# Patient Record
Sex: MALE | Race: WHITE | NOT HISPANIC OR LATINO | URBAN - METROPOLITAN AREA
[De-identification: names, ages, dates, MRNs, and addresses within clinical notes are randomized per-mention and may not be internally consistent; named-entity substitution may affect disease eponyms.]

---

## 2023-07-20 ENCOUNTER — INPATIENT (INPATIENT)
Facility: HOSPITAL | Age: 88
LOS: 3 days | Discharge: NURSING, REHAB OR LONG-TERM CARE | DRG: 683 | End: 2023-07-24
Attending: STUDENT IN AN ORGANIZED HEALTH CARE EDUCATION/TRAINING PROGRAM | Admitting: STUDENT IN AN ORGANIZED HEALTH CARE EDUCATION/TRAINING PROGRAM
Payer: MEDICARE

## 2023-07-20 VITALS
SYSTOLIC BLOOD PRESSURE: 167 MMHG | DIASTOLIC BLOOD PRESSURE: 78 MMHG | TEMPERATURE: 98 F | OXYGEN SATURATION: 98 % | RESPIRATION RATE: 16 BRPM | HEART RATE: 70 BPM

## 2023-07-20 DIAGNOSIS — Z95.810 PRESENCE OF AUTOMATIC (IMPLANTABLE) CARDIAC DEFIBRILLATOR: Chronic | ICD-10-CM

## 2023-07-20 DIAGNOSIS — Z95.2 PRESENCE OF PROSTHETIC HEART VALVE: Chronic | ICD-10-CM

## 2023-07-20 LAB
ALBUMIN SERPL ELPH-MCNC: 3.4 G/DL — SIGNIFICANT CHANGE UP (ref 3.3–5)
ALP SERPL-CCNC: 49 U/L — SIGNIFICANT CHANGE UP (ref 40–120)
ALT FLD-CCNC: 16 U/L — SIGNIFICANT CHANGE UP (ref 12–78)
ANION GAP SERPL CALC-SCNC: 8 MMOL/L — SIGNIFICANT CHANGE UP (ref 5–17)
APTT BLD: 29.8 SEC — SIGNIFICANT CHANGE UP (ref 27.5–35.5)
AST SERPL-CCNC: 14 U/L — LOW (ref 15–37)
BASOPHILS # BLD AUTO: 0.01 K/UL — SIGNIFICANT CHANGE UP (ref 0–0.2)
BASOPHILS NFR BLD AUTO: 0.1 % — SIGNIFICANT CHANGE UP (ref 0–2)
BILIRUB SERPL-MCNC: 0.9 MG/DL — SIGNIFICANT CHANGE UP (ref 0.2–1.2)
BUN SERPL-MCNC: 51 MG/DL — HIGH (ref 7–23)
CALCIUM SERPL-MCNC: 9 MG/DL — SIGNIFICANT CHANGE UP (ref 8.5–10.1)
CHLORIDE SERPL-SCNC: 99 MMOL/L — SIGNIFICANT CHANGE UP (ref 96–108)
CK MB BLD-MCNC: 5.6 % — HIGH (ref 0–3.5)
CK MB CFR SERPL CALC: 3.2 NG/ML — SIGNIFICANT CHANGE UP (ref 0–3.6)
CK SERPL-CCNC: 57 U/L — SIGNIFICANT CHANGE UP (ref 26–308)
CO2 SERPL-SCNC: 25 MMOL/L — SIGNIFICANT CHANGE UP (ref 22–31)
CREAT SERPL-MCNC: 2.7 MG/DL — HIGH (ref 0.5–1.3)
EGFR: 21 ML/MIN/1.73M2 — LOW
EOSINOPHIL # BLD AUTO: 0.04 K/UL — SIGNIFICANT CHANGE UP (ref 0–0.5)
EOSINOPHIL NFR BLD AUTO: 0.4 % — SIGNIFICANT CHANGE UP (ref 0–6)
GLUCOSE SERPL-MCNC: 160 MG/DL — HIGH (ref 70–99)
HCT VFR BLD CALC: 32.4 % — LOW (ref 39–50)
HGB BLD-MCNC: 11.2 G/DL — LOW (ref 13–17)
IMM GRANULOCYTES NFR BLD AUTO: 0.3 % — SIGNIFICANT CHANGE UP (ref 0–0.9)
INR BLD: 1.42 RATIO — HIGH (ref 0.88–1.16)
LACTATE SERPL-SCNC: 1.1 MMOL/L — SIGNIFICANT CHANGE UP (ref 0.7–2)
LIDOCAIN IGE QN: 90 U/L — SIGNIFICANT CHANGE UP (ref 73–393)
LYMPHOCYTES # BLD AUTO: 1.06 K/UL — SIGNIFICANT CHANGE UP (ref 1–3.3)
LYMPHOCYTES # BLD AUTO: 10.7 % — LOW (ref 13–44)
MAGNESIUM SERPL-MCNC: 2.3 MG/DL — SIGNIFICANT CHANGE UP (ref 1.6–2.6)
MCHC RBC-ENTMCNC: 29 PG — SIGNIFICANT CHANGE UP (ref 27–34)
MCHC RBC-ENTMCNC: 34.6 GM/DL — SIGNIFICANT CHANGE UP (ref 32–36)
MCV RBC AUTO: 83.9 FL — SIGNIFICANT CHANGE UP (ref 80–100)
MONOCYTES # BLD AUTO: 0.9 K/UL — SIGNIFICANT CHANGE UP (ref 0–0.9)
MONOCYTES NFR BLD AUTO: 9.1 % — SIGNIFICANT CHANGE UP (ref 2–14)
NEUTROPHILS # BLD AUTO: 7.83 K/UL — HIGH (ref 1.8–7.4)
NEUTROPHILS NFR BLD AUTO: 79.4 % — HIGH (ref 43–77)
NRBC # BLD: 0 /100 WBCS — SIGNIFICANT CHANGE UP (ref 0–0)
NT-PROBNP SERPL-SCNC: HIGH PG/ML (ref 0–450)
PLATELET # BLD AUTO: 148 K/UL — LOW (ref 150–400)
POTASSIUM SERPL-MCNC: 3.9 MMOL/L — SIGNIFICANT CHANGE UP (ref 3.5–5.3)
POTASSIUM SERPL-SCNC: 3.9 MMOL/L — SIGNIFICANT CHANGE UP (ref 3.5–5.3)
PROT SERPL-MCNC: 6.6 G/DL — SIGNIFICANT CHANGE UP (ref 6–8.3)
PROTHROM AB SERPL-ACNC: 16.7 SEC — HIGH (ref 10.5–13.4)
RBC # BLD: 3.86 M/UL — LOW (ref 4.2–5.8)
RBC # FLD: 14.4 % — SIGNIFICANT CHANGE UP (ref 10.3–14.5)
SODIUM SERPL-SCNC: 132 MMOL/L — LOW (ref 135–145)
TROPONIN I, HIGH SENSITIVITY RESULT: 28.2 NG/L — SIGNIFICANT CHANGE UP
TSH SERPL-MCNC: 1.71 UIU/ML — SIGNIFICANT CHANGE UP (ref 0.36–3.74)
WBC # BLD: 9.87 K/UL — SIGNIFICANT CHANGE UP (ref 3.8–10.5)
WBC # FLD AUTO: 9.87 K/UL — SIGNIFICANT CHANGE UP (ref 3.8–10.5)

## 2023-07-20 PROCEDURE — 99285 EMERGENCY DEPT VISIT HI MDM: CPT | Mod: 25

## 2023-07-20 PROCEDURE — 71045 X-RAY EXAM CHEST 1 VIEW: CPT | Mod: 26

## 2023-07-20 PROCEDURE — 93010 ELECTROCARDIOGRAM REPORT: CPT | Mod: 76

## 2023-07-20 PROCEDURE — 74176 CT ABD & PELVIS W/O CONTRAST: CPT | Mod: 26,MA

## 2023-07-20 RX ORDER — ONDANSETRON 8 MG/1
4 TABLET, FILM COATED ORAL ONCE
Refills: 0 | Status: COMPLETED | OUTPATIENT
Start: 2023-07-20 | End: 2023-07-20

## 2023-07-20 RX ORDER — SODIUM CHLORIDE 9 MG/ML
500 INJECTION INTRAMUSCULAR; INTRAVENOUS; SUBCUTANEOUS ONCE
Refills: 0 | Status: COMPLETED | OUTPATIENT
Start: 2023-07-20 | End: 2023-07-20

## 2023-07-20 RX ADMIN — SODIUM CHLORIDE 500 MILLILITER(S): 9 INJECTION INTRAMUSCULAR; INTRAVENOUS; SUBCUTANEOUS at 19:29

## 2023-07-20 NOTE — ED PROVIDER NOTE - CARE PLAN
1 Principal Discharge DX:	Urinary tract obstruction   Principal Discharge DX:	Urinary tract obstruction  Secondary Diagnosis:	JONATHAN (acute kidney injury)

## 2023-07-20 NOTE — ED ADULT NURSE NOTE - NSFALLRISKINTERV_ED_ALL_ED
Assistance OOB with selected safe patient handling equipment if applicable/Communicate fall risk and risk factors to all staff, patient, and family/Provide patient with walking aids/Provide visual cue: yellow wristband, yellow gown, etc/Reinforce activity limits and safety measures with patient and family/Toileting schedule using arm’s reach rule for commode and bathroom/Call bell, personal items and telephone in reach/Instruct patient to call for assistance before getting out of bed/chair/stretcher/Non-slip footwear applied when patient is off stretcher/[place] to call system/Physically safe environment - no spills, clutter or unnecessary equipment/Purposeful Proactive Rounding/Room/bathroom lighting operational, light cord in reach

## 2023-07-20 NOTE — ED ADULT TRIAGE NOTE - CHIEF COMPLAINT QUOTE
Suprapubic pain, has not urinated or had a BM for the last few days.  Abdomen noted to be distended.

## 2023-07-20 NOTE — ED ADULT NURSE NOTE - OBJECTIVE STATEMENT
Received pt c/o abd pain for 2 days with bladder distention.   Phelps placed per stat order with 1700 ML odell urine output.   Pt reports immediate relief of abd/bladder symptoms.  Denies fever at home.  Respirations even and unlabored.  Pt calm skin warm and dry in no acute distress. BN

## 2023-07-20 NOTE — ED PROVIDER NOTE - CLINICAL SUMMARY MEDICAL DECISION MAKING FREE TEXT BOX
89 male, and urinary obstruction to get Phelps catheter, follow-up CT abdomen and pelvis, send CBC, CMP, cardiac enzymes, CK, chest x-ray, gentle IV fluids and likely admit.

## 2023-07-21 DIAGNOSIS — I50.20 UNSPECIFIED SYSTOLIC (CONGESTIVE) HEART FAILURE: ICD-10-CM

## 2023-07-21 DIAGNOSIS — N13.9 OBSTRUCTIVE AND REFLUX UROPATHY, UNSPECIFIED: ICD-10-CM

## 2023-07-21 DIAGNOSIS — Z29.9 ENCOUNTER FOR PROPHYLACTIC MEASURES, UNSPECIFIED: ICD-10-CM

## 2023-07-21 DIAGNOSIS — I25.10 ATHEROSCLEROTIC HEART DISEASE OF NATIVE CORONARY ARTERY WITHOUT ANGINA PECTORIS: ICD-10-CM

## 2023-07-21 DIAGNOSIS — I10 ESSENTIAL (PRIMARY) HYPERTENSION: ICD-10-CM

## 2023-07-21 DIAGNOSIS — N17.9 ACUTE KIDNEY FAILURE, UNSPECIFIED: ICD-10-CM

## 2023-07-21 DIAGNOSIS — R93.89 ABNORMAL FINDINGS ON DIAGNOSTIC IMAGING OF OTHER SPECIFIED BODY STRUCTURES: ICD-10-CM

## 2023-07-21 DIAGNOSIS — K59.00 CONSTIPATION, UNSPECIFIED: ICD-10-CM

## 2023-07-21 DIAGNOSIS — I48.91 UNSPECIFIED ATRIAL FIBRILLATION: ICD-10-CM

## 2023-07-21 DIAGNOSIS — R33.9 RETENTION OF URINE, UNSPECIFIED: ICD-10-CM

## 2023-07-21 LAB
A1C WITH ESTIMATED AVERAGE GLUCOSE RESULT: 6.9 % — HIGH (ref 4–5.6)
ALBUMIN SERPL ELPH-MCNC: 3.1 G/DL — LOW (ref 3.3–5)
ALP SERPL-CCNC: 46 U/L — SIGNIFICANT CHANGE UP (ref 40–120)
ALT FLD-CCNC: 15 U/L — SIGNIFICANT CHANGE UP (ref 12–78)
ANION GAP SERPL CALC-SCNC: 6 MMOL/L — SIGNIFICANT CHANGE UP (ref 5–17)
ANION GAP SERPL CALC-SCNC: 8 MMOL/L — SIGNIFICANT CHANGE UP (ref 5–17)
APPEARANCE UR: ABNORMAL
APTT BLD: 24.4 SEC — LOW (ref 27.5–35.5)
APTT BLD: 29.9 SEC — SIGNIFICANT CHANGE UP (ref 27.5–35.5)
AST SERPL-CCNC: 14 U/L — LOW (ref 15–37)
BACTERIA # UR AUTO: ABNORMAL /HPF
BASOPHILS # BLD AUTO: 0.02 K/UL — SIGNIFICANT CHANGE UP (ref 0–0.2)
BASOPHILS NFR BLD AUTO: 0.3 % — SIGNIFICANT CHANGE UP (ref 0–2)
BILIRUB SERPL-MCNC: 1.1 MG/DL — SIGNIFICANT CHANGE UP (ref 0.2–1.2)
BILIRUB UR-MCNC: NEGATIVE — SIGNIFICANT CHANGE UP
BUN SERPL-MCNC: 41 MG/DL — HIGH (ref 7–23)
BUN SERPL-MCNC: 46 MG/DL — HIGH (ref 7–23)
CALCIUM SERPL-MCNC: 9 MG/DL — SIGNIFICANT CHANGE UP (ref 8.5–10.1)
CALCIUM SERPL-MCNC: 9.2 MG/DL — SIGNIFICANT CHANGE UP (ref 8.5–10.1)
CHLORIDE SERPL-SCNC: 109 MMOL/L — HIGH (ref 96–108)
CHLORIDE SERPL-SCNC: 111 MMOL/L — HIGH (ref 96–108)
CHOLEST SERPL-MCNC: 163 MG/DL — SIGNIFICANT CHANGE UP
CO2 SERPL-SCNC: 23 MMOL/L — SIGNIFICANT CHANGE UP (ref 22–31)
CO2 SERPL-SCNC: 26 MMOL/L — SIGNIFICANT CHANGE UP (ref 22–31)
COLOR SPEC: ABNORMAL
CREAT SERPL-MCNC: 1.4 MG/DL — HIGH (ref 0.5–1.3)
CREAT SERPL-MCNC: 1.8 MG/DL — HIGH (ref 0.5–1.3)
DIFF PNL FLD: ABNORMAL
EGFR: 35 ML/MIN/1.73M2 — LOW
EGFR: 47 ML/MIN/1.73M2 — LOW
EOSINOPHIL # BLD AUTO: 0.13 K/UL — SIGNIFICANT CHANGE UP (ref 0–0.5)
EOSINOPHIL NFR BLD AUTO: 2.1 % — SIGNIFICANT CHANGE UP (ref 0–6)
EPI CELLS # UR: SIGNIFICANT CHANGE UP
ESTIMATED AVERAGE GLUCOSE: 151 MG/DL — HIGH (ref 68–114)
GLUCOSE SERPL-MCNC: 143 MG/DL — HIGH (ref 70–99)
GLUCOSE SERPL-MCNC: 93 MG/DL — SIGNIFICANT CHANGE UP (ref 70–99)
GLUCOSE UR QL: NEGATIVE MG/DL — SIGNIFICANT CHANGE UP
HCT VFR BLD CALC: 33.6 % — LOW (ref 39–50)
HCT VFR BLD CALC: 34 % — LOW (ref 39–50)
HDLC SERPL-MCNC: 43 MG/DL — SIGNIFICANT CHANGE UP
HGB BLD-MCNC: 11 G/DL — LOW (ref 13–17)
HGB BLD-MCNC: 11.2 G/DL — LOW (ref 13–17)
IMM GRANULOCYTES NFR BLD AUTO: 0.2 % — SIGNIFICANT CHANGE UP (ref 0–0.9)
INR BLD: 1.42 RATIO — HIGH (ref 0.88–1.16)
INR BLD: 1.46 RATIO — HIGH (ref 0.88–1.16)
KETONES UR-MCNC: NEGATIVE MG/DL — SIGNIFICANT CHANGE UP
LEUKOCYTE ESTERASE UR-ACNC: ABNORMAL
LIPID PNL WITH DIRECT LDL SERPL: 101 MG/DL — HIGH
LYMPHOCYTES # BLD AUTO: 1.53 K/UL — SIGNIFICANT CHANGE UP (ref 1–3.3)
LYMPHOCYTES # BLD AUTO: 24.6 % — SIGNIFICANT CHANGE UP (ref 13–44)
MCHC RBC-ENTMCNC: 28.4 PG — SIGNIFICANT CHANGE UP (ref 27–34)
MCHC RBC-ENTMCNC: 28.8 PG — SIGNIFICANT CHANGE UP (ref 27–34)
MCHC RBC-ENTMCNC: 32.4 GM/DL — SIGNIFICANT CHANGE UP (ref 32–36)
MCHC RBC-ENTMCNC: 33.3 GM/DL — SIGNIFICANT CHANGE UP (ref 32–36)
MCV RBC AUTO: 86.4 FL — SIGNIFICANT CHANGE UP (ref 80–100)
MCV RBC AUTO: 87.6 FL — SIGNIFICANT CHANGE UP (ref 80–100)
MONOCYTES # BLD AUTO: 0.76 K/UL — SIGNIFICANT CHANGE UP (ref 0–0.9)
MONOCYTES NFR BLD AUTO: 12.2 % — SIGNIFICANT CHANGE UP (ref 2–14)
NEUTROPHILS # BLD AUTO: 3.78 K/UL — SIGNIFICANT CHANGE UP (ref 1.8–7.4)
NEUTROPHILS NFR BLD AUTO: 60.6 % — SIGNIFICANT CHANGE UP (ref 43–77)
NITRITE UR-MCNC: NEGATIVE — SIGNIFICANT CHANGE UP
NON HDL CHOLESTEROL: 120 MG/DL — SIGNIFICANT CHANGE UP
NRBC # BLD: 0 /100 WBCS — SIGNIFICANT CHANGE UP (ref 0–0)
NRBC # BLD: 0 /100 WBCS — SIGNIFICANT CHANGE UP (ref 0–0)
PH UR: 5.5 — SIGNIFICANT CHANGE UP (ref 5–8)
PLATELET # BLD AUTO: 135 K/UL — LOW (ref 150–400)
PLATELET # BLD AUTO: 167 K/UL — SIGNIFICANT CHANGE UP (ref 150–400)
POTASSIUM SERPL-MCNC: 3.6 MMOL/L — SIGNIFICANT CHANGE UP (ref 3.5–5.3)
POTASSIUM SERPL-MCNC: 3.7 MMOL/L — SIGNIFICANT CHANGE UP (ref 3.5–5.3)
POTASSIUM SERPL-SCNC: 3.6 MMOL/L — SIGNIFICANT CHANGE UP (ref 3.5–5.3)
POTASSIUM SERPL-SCNC: 3.7 MMOL/L — SIGNIFICANT CHANGE UP (ref 3.5–5.3)
PROT SERPL-MCNC: 6.1 G/DL — SIGNIFICANT CHANGE UP (ref 6–8.3)
PROT UR-MCNC: 100 MG/DL
PROTHROM AB SERPL-ACNC: 16.7 SEC — HIGH (ref 10.5–13.4)
PROTHROM AB SERPL-ACNC: 17.1 SEC — HIGH (ref 10.5–13.4)
RBC # BLD: 3.88 M/UL — LOW (ref 4.2–5.8)
RBC # BLD: 3.89 M/UL — LOW (ref 4.2–5.8)
RBC # FLD: 14.1 % — SIGNIFICANT CHANGE UP (ref 10.3–14.5)
RBC # FLD: 14.4 % — SIGNIFICANT CHANGE UP (ref 10.3–14.5)
RBC CASTS # UR COMP ASSIST: SIGNIFICANT CHANGE UP /HPF (ref 0–4)
SODIUM SERPL-SCNC: 140 MMOL/L — SIGNIFICANT CHANGE UP (ref 135–145)
SODIUM SERPL-SCNC: 143 MMOL/L — SIGNIFICANT CHANGE UP (ref 135–145)
SP GR SPEC: 1 — SIGNIFICANT CHANGE UP (ref 1–1.03)
TRIGL SERPL-MCNC: 106 MG/DL — SIGNIFICANT CHANGE UP
UROBILINOGEN FLD QL: 0.2 MG/DL — SIGNIFICANT CHANGE UP (ref 0.2–1)
WBC # BLD: 6.23 K/UL — SIGNIFICANT CHANGE UP (ref 3.8–10.5)
WBC # BLD: 6.51 K/UL — SIGNIFICANT CHANGE UP (ref 3.8–10.5)
WBC # FLD AUTO: 6.23 K/UL — SIGNIFICANT CHANGE UP (ref 3.8–10.5)
WBC # FLD AUTO: 6.51 K/UL — SIGNIFICANT CHANGE UP (ref 3.8–10.5)
WBC UR QL: SIGNIFICANT CHANGE UP /HPF (ref 0–5)

## 2023-07-21 PROCEDURE — 99223 1ST HOSP IP/OBS HIGH 75: CPT | Mod: GC,AI

## 2023-07-21 RX ORDER — ATORVASTATIN CALCIUM 80 MG/1
0 TABLET, FILM COATED ORAL
Qty: 0 | Refills: 0 | DISCHARGE

## 2023-07-21 RX ORDER — ATENOLOL 25 MG/1
1 TABLET ORAL
Refills: 0 | DISCHARGE

## 2023-07-21 RX ORDER — DOXAZOSIN MESYLATE 4 MG
4 TABLET ORAL AT BEDTIME
Refills: 0 | Status: DISCONTINUED | OUTPATIENT
Start: 2023-07-21 | End: 2023-07-24

## 2023-07-21 RX ORDER — APIXABAN 2.5 MG/1
2.5 TABLET, FILM COATED ORAL EVERY 12 HOURS
Refills: 0 | Status: DISCONTINUED | OUTPATIENT
Start: 2023-07-21 | End: 2023-07-21

## 2023-07-21 RX ORDER — POLYETHYLENE GLYCOL 3350 17 G/17G
17 POWDER, FOR SOLUTION ORAL DAILY
Refills: 0 | Status: DISCONTINUED | OUTPATIENT
Start: 2023-07-21 | End: 2023-07-23

## 2023-07-21 RX ORDER — LOSARTAN POTASSIUM 100 MG/1
0 TABLET, FILM COATED ORAL
Qty: 0 | Refills: 0 | DISCHARGE

## 2023-07-21 RX ORDER — FUROSEMIDE 40 MG
0 TABLET ORAL
Qty: 0 | Refills: 0 | DISCHARGE

## 2023-07-21 RX ORDER — DOXAZOSIN MESYLATE 4 MG
0 TABLET ORAL
Qty: 0 | Refills: 0 | DISCHARGE

## 2023-07-21 RX ORDER — FUROSEMIDE 40 MG
1 TABLET ORAL
Refills: 0 | DISCHARGE

## 2023-07-21 RX ORDER — SPIRONOLACTONE 25 MG/1
1 TABLET, FILM COATED ORAL
Refills: 0 | DISCHARGE

## 2023-07-21 RX ORDER — APIXABAN 2.5 MG/1
2.5 TABLET, FILM COATED ORAL EVERY 12 HOURS
Refills: 0 | Status: DISCONTINUED | OUTPATIENT
Start: 2023-07-21 | End: 2023-07-24

## 2023-07-21 RX ORDER — DOXAZOSIN MESYLATE 4 MG
1 TABLET ORAL
Refills: 0 | DISCHARGE

## 2023-07-21 RX ORDER — LANOLIN ALCOHOL/MO/W.PET/CERES
3 CREAM (GRAM) TOPICAL AT BEDTIME
Refills: 0 | Status: DISCONTINUED | OUTPATIENT
Start: 2023-07-21 | End: 2023-07-24

## 2023-07-21 RX ORDER — APIXABAN 2.5 MG/1
0 TABLET, FILM COATED ORAL
Qty: 0 | Refills: 3 | DISCHARGE

## 2023-07-21 RX ORDER — ACETAMINOPHEN 500 MG
650 TABLET ORAL EVERY 6 HOURS
Refills: 0 | Status: DISCONTINUED | OUTPATIENT
Start: 2023-07-21 | End: 2023-07-24

## 2023-07-21 RX ORDER — PANTOPRAZOLE SODIUM 20 MG/1
40 TABLET, DELAYED RELEASE ORAL ONCE
Refills: 0 | Status: COMPLETED | OUTPATIENT
Start: 2023-07-21 | End: 2023-07-21

## 2023-07-21 RX ORDER — LOSARTAN POTASSIUM 100 MG/1
1 TABLET, FILM COATED ORAL
Refills: 0 | DISCHARGE

## 2023-07-21 RX ORDER — SENNA PLUS 8.6 MG/1
2 TABLET ORAL AT BEDTIME
Refills: 0 | Status: DISCONTINUED | OUTPATIENT
Start: 2023-07-21 | End: 2023-07-23

## 2023-07-21 RX ORDER — APIXABAN 2.5 MG/1
1 TABLET, FILM COATED ORAL
Refills: 0 | DISCHARGE

## 2023-07-21 RX ORDER — ATENOLOL 25 MG/1
25 TABLET ORAL DAILY
Refills: 0 | Status: DISCONTINUED | OUTPATIENT
Start: 2023-07-21 | End: 2023-07-24

## 2023-07-21 RX ORDER — ACETAMINOPHEN 500 MG
650 TABLET ORAL EVERY 6 HOURS
Refills: 0 | Status: DISCONTINUED | OUTPATIENT
Start: 2023-07-21 | End: 2023-07-21

## 2023-07-21 RX ORDER — SPIRONOLACTONE 25 MG/1
0 TABLET, FILM COATED ORAL
Qty: 0 | Refills: 0 | DISCHARGE

## 2023-07-21 RX ADMIN — ATENOLOL 25 MILLIGRAM(S): 25 TABLET ORAL at 05:59

## 2023-07-21 RX ADMIN — Medication 4 MILLIGRAM(S): at 22:54

## 2023-07-21 RX ADMIN — SODIUM CHLORIDE 500 MILLILITER(S): 9 INJECTION INTRAMUSCULAR; INTRAVENOUS; SUBCUTANEOUS at 02:00

## 2023-07-21 RX ADMIN — APIXABAN 2.5 MILLIGRAM(S): 2.5 TABLET, FILM COATED ORAL at 17:22

## 2023-07-21 RX ADMIN — APIXABAN 2.5 MILLIGRAM(S): 2.5 TABLET, FILM COATED ORAL at 02:27

## 2023-07-21 RX ADMIN — POLYETHYLENE GLYCOL 3350 17 GRAM(S): 17 POWDER, FOR SOLUTION ORAL at 13:33

## 2023-07-21 RX ADMIN — Medication 1 ENEMA: at 15:39

## 2023-07-21 RX ADMIN — PANTOPRAZOLE SODIUM 40 MILLIGRAM(S): 20 TABLET, DELAYED RELEASE ORAL at 21:56

## 2023-07-21 RX ADMIN — SENNA PLUS 2 TABLET(S): 8.6 TABLET ORAL at 22:04

## 2023-07-21 NOTE — H&P ADULT - NSHPREVIEWOFSYSTEMS_GEN_ALL_CORE
CONSTITUTIONAL: +weakness. denies fever, chills, diaphoresis, dizziness, lightheadedness  HEENT: denies blurred vision, sore throat, cough  SKIN: denies new lesions, rash, hives, itching  CARDIOVASCULAR: denies chest pain, chest pressure, palpitations  RESPIRATORY: denies shortness of breath, MENDOZA, wheezing, sputum production  GASTROINTESTINAL: +nausea, +vomiting, +constipation, +abd pain. Denies diarrhea, constipation, hematochezia, melena  GENITOURINARY: +anuria. Denies dysuria, polyuria, hematuria, discharge  NEUROLOGICAL: denies syncope, LOC, numbness, headache, focal weakness  MUSCULOSKELETAL: denies new joint pain, joint swelling, muscle aches  HEMATOLOGIC: denies gross bleeding, bruising  LYMPHATICS: denies enlarged lymph nodes, extremity swelling  PSYCHIATRIC: denies recent changes in anxiety, depression  ENDOCRINOLOGIC: denies sweating, cold or heat intolerance 2

## 2023-07-21 NOTE — H&P ADULT - PROBLEM SELECTOR PLAN 5
chronic  - continue home atenolol with hold parameters  - hold home spironolactone, losartan, and lasix in setting of JONATHAN   - monitor hemodynamics

## 2023-07-21 NOTE — H&P ADULT - NSHPSOCIALHISTORY_GEN_ALL_CORE
Tobacco: ex-smoker  EtOH: occassional  Recreational drug use: denies  Lives with: patient lives alone at home  Ambulates: uses cane  ADLs: independent Tobacco: ex-smoker  EtOH: occasional  Recreational drug use: denies  Lives with: patient lives alone at home  Ambulates: uses cane  ADLs: independent

## 2023-07-21 NOTE — H&P ADULT - NSICDXFAMILYHX_GEN_ALL_CORE_FT
FAMILY HISTORY:  No pertinent family history in first degree relatives FAMILY HISTORY:  Father  Still living? Unknown  FHx: heart disease, Age at diagnosis: Age Unknown    Mother  Still living? No  FHx: heart disease, Age at diagnosis: Age Unknown

## 2023-07-21 NOTE — ED ADULT NURSE REASSESSMENT NOTE - NS ED NURSE REASSESS COMMENT FT1
pt reavaluated admitted med surg medicated as ordered vital signs stable awaiting bed assignment
received pt stable ekg done and reviewed pt stable awaiting ct scan urine with odell color noted
report given pt stable in no distress awaiting transfer to floor

## 2023-07-21 NOTE — PHYSICAL THERAPY INITIAL EVALUATION ADULT - PERTINENT HX OF CURRENT PROBLEM, REHAB EVAL
89y male with PMHx of HFrEF s/p PPM/AICD, CAD s/p CABG x6, Atrial fibrillation (on eliquis), s/p AVR, HTN, HLD, BPH with LUTS, squamous cell carcinoma admitted with urinary tract obstruction and JONATHAN likely secondary to prostatomegaly.

## 2023-07-21 NOTE — H&P ADULT - PROBLEM SELECTOR PLAN 3
CT abd pelvis: Trace bilateral pleural effusions with associated atelectasis at the lung bases  - no signs of volume overload at this time  - monitor Is and Os  - continue home atenolol with hold parameters  -  hold home spironolactone and lasix in setting of JONATHAN CT abd pelvis: Trace bilateral pleural effusions with associated atelectasis at the lung bases  - no signs of volume overload at this time  - monitor Is and Os  - continue home atenolol with hold parameters  - hold home spironolactone and lasix in setting of JONATHAN

## 2023-07-21 NOTE — H&P ADULT - PROBLEM SELECTOR PLAN 4
chronic  - continue home eliquis 2.5mg BID  - monitor for signs of acute bleed chronic  - continue atenolol for rate control  - continue home eliquis 2.5mg BID  - monitor for signs of acute bleed

## 2023-07-21 NOTE — H&P ADULT - HISTORY OF PRESENT ILLNESS
89y male with PMHx of HFrEF s/p PPM/AICD, CAD, Atrial fibrillation (on eliquis), s/p AVR, HTN, HLD, BPH with LUTS presented to the ED for the evaluation of abdominal pain and weakness associated with decreased urinary output and constipation.    In the ED,  Vital Signs T(F): 98 HR: 70 BP: 167/78 RR: 16 SpO2: 98%  Labs significant for: H/H 11.2/32.4, INR 1.42, Na 132, BUN/Cr 51/2.70, proBNP 75728  CXR: increased markings with no effusion seen, left sided cardiac pacemaker in place on personal review  CT A/P: Enlarged prostate gland with mild bilateral hydroureteronephrosis and perinephric stranding. Subcentimeter pancreatic cystic lesions measuring up to 0.9 cm in the pancreatic neck. MRI/MRCP can be obtained for further evaluation. Trace bilateral pleural effusions with associated atelectasis at the lung bases. Cholelithiasis.  EKG: Vpaced at 70bpm 89y male with PMHx of HFrEF s/p PPM/AICD, CAD s/p CABG x6, Atrial fibrillation (on eliquis), s/p AVR, HTN, HLD, BPH with LUTS, squamous cell carcinoma presented to the ED for the evaluation of abdominal pain and weakness associated with decreased urinary output and constipation for the past 4 days. Patient developed nausea for the past 2 days and two episodes of NBNB vomiting. Patient and daughter denies fever, chills, CP, SOB, dysuria, hematuria, diarrhea. Patient states after bautista was placed in ED there was ~2L of drainage and symptoms of abdominal pain resolved.     In the ED,  Vital Signs T(F): 98 HR: 70 BP: 167/78 RR: 16 SpO2: 98%  Labs significant for: H/H 11.2/32.4, INR 1.42, Na 132, BUN/Cr 51/2.70, proBNP 43435  CXR: increased markings with no effusion seen, left sided cardiac pacemaker in place on personal review  CT A/P: Enlarged prostate gland with mild bilateral hydroureteronephrosis and perinephric stranding. Subcentimeter pancreatic cystic lesions measuring up to 0.9 cm in the pancreatic neck. MRI/MRCP can be obtained for further evaluation. Trace bilateral pleural effusions with associated atelectasis at the lung bases. Cholelithiasis.  EKG: Vpaced at 70bpm

## 2023-07-21 NOTE — H&P ADULT - NSICDXPASTMEDICALHX_GEN_ALL_CORE_FT
PAST MEDICAL HISTORY:  Atrial fibrillation     BPH (benign prostatic hyperplasia)     CAD (coronary artery disease)     HFrEF (heart failure with reduced ejection fraction)     HLD (hyperlipidemia)     HTN (hypertension)     Squamous cell carcinoma of skin

## 2023-07-21 NOTE — H&P ADULT - PROBLEM SELECTOR PLAN 2
JONATHAN likely post-renal in setting of prostatomegaly  - Cr 2.7 on admission  - Monitor BMP  - monitor Is and Os  - Avoid nephrotoxic medications, hold home spironolactone, losartan and lasix  - Monitor renal indices  - Nephro [place] consulted JONATHAN likely post-renal in setting of prostatomegaly  - Cr 2.7 on admission  - Monitor BMP  - given 500cc in the ER, hold off further fluids  - monitor Is and Os  - Avoid nephrotoxic medications, hold home spironolactone, losartan and lasix  - Monitor renal indices  - Nephro NJ consulted

## 2023-07-21 NOTE — PATIENT PROFILE ADULT - FALL HARM RISK - HARM RISK INTERVENTIONS

## 2023-07-21 NOTE — H&P ADULT - PROBLEM SELECTOR PLAN 7
CT abd/pelvis: Subcentimeter pancreatic cystic lesions measuring up to 0.9 cm in the pancreatic neck  - liver function and lipase WNL  - f/u outpatient CT abd/pelvis: Subcentimeter pancreatic cystic lesions measuring up to 0.9 cm in the pancreatic neck  - liver function and lipase WNL  - outpatient follow up

## 2023-07-21 NOTE — H&P ADULT - NSICDXPASTSURGICALHX_GEN_ALL_CORE_FT
PAST SURGICAL HISTORY:  S/P AVR     S/P implantation of automatic cardioverter/defibrillator (AICD)

## 2023-07-21 NOTE — PHYSICAL THERAPY INITIAL EVALUATION ADULT - ADDITIONAL COMMENTS
Patient reports that he lives in a private house, lives alone, has 4 daughters who take turns assisting the patient. Has been independent with ambulation using cane/RW up to this point. Has cane/RW at home.

## 2023-07-21 NOTE — H&P ADULT - ASSESSMENT
89y male with PMHx of HFrEF s/p PPM/AICD, CAD, Atrial fibrillation (on eliquis), s/p AVR, HTN, HLD, BPH with LUTS admitted with urinary tract obstruction and JONATHAN likely secondary to prostatomegaly. 89y male with PMHx of HFrEF s/p PPM/AICD, CAD s/p CABG x6, Atrial fibrillation (on eliquis), s/p AVR, HTN, HLD, BPH with LUTS, squamous cell carcinoma admitted with urinary tract obstruction and JONATHAN likely secondary to prostatomegaly.

## 2023-07-21 NOTE — H&P ADULT - NSHPPHYSICALEXAM_GEN_ALL_CORE
T(C): 36.7 (07-20-23 @ 16:57), Max: 36.7 (07-20-23 @ 16:57)  HR: 70 (07-20-23 @ 16:57) (70 - 70)  BP: 167/78 (07-20-23 @ 16:57) (167/78 - 167/78)  RR: 16 (07-20-23 @ 16:57) (16 - 16)  SpO2: 98% (07-20-23 @ 16:57) (98% - 98%)    GENERAL: patient appears well, no acute distress, appropriate, pleasant  EYES: sclera clear, no exudates  ENMT: oropharynx clear without erythema, no exudates, moist mucous membranes, conjunctiva and sclera clear, PERRLA, EOMI  NECK: supple, soft, no thyromegaly noted  LUNGS: good air entry bilaterally, clear to auscultation, symmetric breath sounds, no wheezing, rhonchi, or rales appreciated  HEART: soft S1/S2, regular rate and rhythm, no murmurs noted, +2 pitting lower extremity edema  GASTROINTESTINAL: abdomen is soft, nontender, nondistended, normoactive bowel sounds, no palpable masses. No guarding, rebound or rigidity  GENITOURINARY: Phelps bag draining ~1L of pink urine, no blood clots or acute hemorrhage  VASCULAR: + DP pulses present, no varicose veins  INTEGUMENT: +old midline chest scar, warm and dry, color normal, good skin turgor, no lesions noted  MUSCULOSKELETAL: no clubbing or cyanosis, no obvious deformity  NEUROLOGIC: AAOx3, good muscle tone in 4 extremities, motor Strength 5/5 in UE & LE B/L, no obvious sensory deficits  PSYCHIATRIC: mood is good, affect is congruent, linear and logical thought process  HEME/LYMPH: no obvious ecchymosis or petechiae T(C): 36.7 (07-20-23 @ 16:57), Max: 36.7 (07-20-23 @ 16:57)  HR: 70 (07-20-23 @ 16:57) (70 - 70)  BP: 167/78 (07-20-23 @ 16:57) (167/78 - 167/78)  RR: 16 (07-20-23 @ 16:57) (16 - 16)  SpO2: 98% (07-20-23 @ 16:57) (98% - 98%)    GENERAL: patient appears well, no acute distress, appropriate, pleasant  EYES: sclera clear, no exudates  ENMT: oropharynx clear without erythema, no exudates, moist mucous membranes, conjunctiva and sclera clear, PERRLA, EOMI  NECK: supple, soft, no thyromegaly noted  LUNGS: good air entry bilaterally, clear to auscultation, symmetric breath sounds, no wheezing, rhonchi, or rales appreciated  HEART: soft S1/S2, regular rate and rhythm, no murmurs noted, +2 pitting lower extremity edema  GASTROINTESTINAL: abdomen is soft, nontender, nondistended, normoactive bowel sounds, no palpable masses. No guarding, rebound or rigidity  GENITOURINARY: Phelps bag draining ~1L of pink urine, no blood clots or acute hemorrhage  VASCULAR: + DP pulses present, no varicose veins  INTEGUMENT: +old midline chest scar, warm and dry, color normal, good skin turgor, no lesions noted  MUSCULOSKELETAL: no clubbing or cyanosis, no obvious deformity  NEUROLOGIC: AAOx3, good muscle tone in 4 extremities, motor Strength 5/5 in UE & LE B/L, no obvious sensory deficits  PSYCHIATRIC: normal affect  HEME/LYMPH: no obvious ecchymosis or petechiae

## 2023-07-21 NOTE — PATIENT PROFILE ADULT - HAVE YOU RECENTLY LOST WEIGHT WITHOUT TRYING?
[address] [facility] [address] [id number] [address]  Dept: [phone]     PAIN PROGRESS NOTE  Date of patient's visit: 9/8/2020  Patient's Name:  Sabra Carney YOB: 1948            Patient Care Team:  Jennifer Donaldson PA-C as PCP - General (Physician Assistant)  Jennifer Donaldson PA-C as PCP - [facility] Empaneled Provider  Durga Lezama MD as Consulting Physician (Gastroenterology)  Satnam Thibodeaux MD as Consulting Physician [facility])      Chief Complaint   Patient presents with    Nail Problem    Foot Pain       Subjective: This Sabra Carney comes to clinic for foot and nail care. Pt currently has complaint of thickened, painful, elongated nails that he/she cannot manage by themselves. Pt. Relates pain to nails with shoe gear. Pt's primary care physician is Jennifer Donaldson PA-C last seen august 28 2020.      Past Medical History:   Diagnosis Date    Anxiety     Hyperlipidemia     Hypertension        Allergies   Allergen Reactions    Cupric Oxide     Chrome Alum     Cobalt     Copper-Containing Compounds     Nickel      Current Outpatient Medications on File Prior to Visit   Medication Sig Dispense Refill    pregabalin (LYRICA) 50 MG capsule TAKE 1 CAPSULE BY MOUTH THREE TIMES A DAY  90 capsule 1    pilocarpine (SALAGEN) 5 MG tablet TAKE 1 TABLET BY MOUTH THREE TIMES A DAY 90 tablet 3    atorvastatin (LIPITOR) 10 MG tablet TAKE 1 TABLET BY MOUTH ONE TIME A DAY 90 tablet 3    calcium carbonate (OSCAL) 500 MG TABS tablet Take 500 mg by mouth daily      magnesium 30 MG tablet Take 30 mg by mouth 2 times daily      cholestyramine (QUESTRAN) 4 g packet Take 1 packet by mouth 2 times daily 90 packet 3    allopurinol (ZYLOPRIM) 300 MG tablet TAKE 1 TABLET BY MOUTH ONE TIME A DAY 90 tablet 3    traZODone (DESYREL) 50 MG tablet Take 1 tablet by mouth nightly 30 tablet 2    omeprazole (PRILOSEC) 20 MG delayed release capsule TAKE 1 CAPSULE BY MOUTH ONE TIME A DAY. 90 capsule 3    citalopram (CELEXA) 10 MG tablet TAKE 1 TABLET BY MOUTH ONE TIME A DAY 30 tablet 5    topiramate (TOPAMAX) 50 MG tablet Take 1 tablet by mouth 2 times daily 60 tablet 5    clopidogrel (PLAVIX) 75 MG tablet Take 1 tablet by mouth daily 90 tablet 3    carvedilol (COREG) 6.25 MG tablet Take 1 tablet by mouth daily 30 tablet 2    Galcanezumab-gnlm (EMGALITY) 120 MG/ML SOAJ Inject into the skin      lisinopril (PRINIVIL;ZESTRIL) 10 MG tablet TAKE 1 TABLET BY MOUTH ONE TIME A DAY 30 tablet 5    Multiple Vitamins-Minerals (OCUVITE PRESERVISION PO) Take 1 tablet by mouth daily      Acetaminophen (TYLENOL EXTRA STRENGTH PO) Take by mouth Indications: prn      POTASSIUM GLUCONATE PO Take by mouth daily       No current facility-administered medications on file prior to visit. Review of Systems. Review of Systems:   History obtained from chart review and the patient  General ROS: negative for - chills, fatigue, fever, night sweats or weight gain  Constitutional: Negative for chills, diaphoresis, fatigue, fever and unexpected weight change. Musculoskeletal: Positive for arthralgias, gait problem and joint swelling. Neurological ROS: negative for - behavioral changes, confusion, headaches or seizures. Negative for weakness and numbness. Dermatological ROS: negative for - mole changes, rash  Cardiovascular: Negative for leg swelling. Gastrointestinal: Negative for constipation, diarrhea, nausea and vomiting. Objective:  Dermatologic Exam:  Skin lesion/ulceration Absent . Skin No rashes or nodules noted. .   Skin is thin, with flaky sloughing skin as well as decreased hair growth to the lower leg  Small red hemosiderin deposits seen dorsal foot   Musculoskeletal:     1st MPJ ROM decreased, Bilateral.  Muscle strength 5/5, Bilateral.  Pain present upon palpation of toenails 1-5, Bilateral. decreased medial longitudinal arch, Bilateral.  Ankle ROM decreased,Bilateral.    Dorsally contracted digits present digits 2, Bilateral.     Vascular: DP pulses 1/4 bilateral.  PT pulses 0/4 bilateral.   CFT <5 seconds, Bilateral.  Hair growth absent to the level of the digits, Bilateral.  Edema present, Bilateral.  Varicosities absent, Bilateral. Erythema absent, Bilateral    Neurological: Sensation diminshed to light touch to level of digits, Bilateral.  Protective sensation intact 6/10 sites via 5.07/10g [place] Monofilament, Bilateral.  negative Tinel's, Bilateral.  negative Valleix sign, Bilateral.      Integument: Warm, dry, supple, Bilateral.  Open lesion absent, Bilateral.  Interdigital maceration absent to web spaces 4, Bilateral.  Nails 1-5 left and 1-5 right thickened > 3.0 mm, dystrophic and crumbly, discolored with subungual debris. Fissures absent, Bilateral.   General: AAO x 3 in NAD. Derm  Toenail Description  Sites of Onychomycosis Involvement (Check affected area)  [x] [x] [x] [x] [x] [x] [x] [x] [x] [x]  5 4 3 2 1 1 2 3 4 5                          Right                                        Left    Thickness  [x] [x] [x] [x] [x] [x] [x] [x] [x] [x]  5 4 3 2 1 1 2 3 4 5                         Right                                        Left    Dystrophic Changes   [x] [x] [x] [x] [x] [x] [x] [x] [x] [x]  5 4 3 2 1 1 2 3 4 5                         Right                                        Left    Color  [x] [x] [x] [x] [x] [x] [x] [x] [x] [x]  5 4 3 2 1 1 2 3 4 5                          Right                                        Left    Incurvation/Ingrowin   [] [] [] [] [] [] [] [] [] []  5 4 3 2 1 1 2 3 4 5                         Right                                        Left    Inflammation/Pain   [x] [x] [x] [x] [x] [x] [x] [x] [x] [x]  5 4 3  2 1 1 2 3 4 5                         Right                                        Left       Nails are painful 1-10 with direct palpation.       Q7   []Yes  []No Q8   [x]Yes  []No                     Q9   []Yes    []No  Assessment:  70 y.o. male with:    Diagnosis Orders   1. Dermatophytosis of nail  85897 - [place] DEBRIDEMENT OF NAILS, 6 OR MORE   2. Bilateral lower extremity pain  79820 - [place] DEBRIDEMENT OF NAILS, 6 OR MORE   3. PVD (peripheral vascular disease) ([facility] [address]  94683 - [place] DEBRIDEMENT OF NAILS, 6 OR MORE   4. Ingrown nail  89773 - [place] DEBRIDEMENT OF NAILS, 6 OR MORE         Plan:   Pt was evaluated and examined. Patient was given personalized discharge instructions. Nails 1-10 were debrided in length and thickness sharply with a nail nipper and  without incident. Pt will follow up in 9 weeks or sooner if any problems arise. Diagnosis was discussed with the pt and all of their questions were answered in detail. Proper foot hygiene and care was discussed with the pt. Patient to check feet daily and contact the office with any questions/problems/concerns. Other comorbidity noted and will be managed by PCP. Pain waiver discussed with patient and confirmed.    9/8/2020      Electronically signed by Lynda Hanson DPM on 9/8/2020 at 10:04 AM  9/8/2020 Yes...

## 2023-07-21 NOTE — H&P ADULT - ATTENDING COMMENTS
89y male with PMHx of HFrEF s/p PPM/AICD, CAD s/p CABG x6, Atrial fibrillation (on eliquis), s/p AVR, HTN, HLD, BPH with LUTS, squamous cell carcinoma admitted with urinary tract obstruction and JONATHAN likely secondary to prostatomegaly. Admit to medicine. Likely all postobstructive. Hold off further fluids in setting of CHF. Monitor renal indices. Hold ARB and diuretics. Nephrology consult Dr. Blackman. [facility] hospitalist to follow in AM. Consider urology evaluation in AM (needs urology follow up). Discussed with patient at bedside.    Agree with H&P as outlined above, edited where appropriate.

## 2023-07-21 NOTE — CHART NOTE - NSCHARTNOTEFT_GEN_A_CORE
Called by RN for Pt c/o hematuria, dark stool. Pt evaluated at bedside, states that he feels well. No longer constipated as he had a BM today following administration of Miralax, fleet enema. Reports that he experienced a similar episode of hematuria the previous time he had a Phelps catheter. Denies any abdominal pain, pelvic pain, gross blood in stool. Denies lightheadedness, shortness of breath, chest pain, palpitations.    T(C): 37 (07-21-23 @ 11:40), Max: 37 (07-21-23 @ 11:40)  HR: 87 (07-21-23 @ 11:40) (70 - 87)  BP: 108/63 (07-21-23 @ 11:40) (108/63 - 135/62)  RR: 18 (07-21-23 @ 11:40) (16 - 18)  SpO2: 95% (07-21-23 @ 11:40) (94% - 98%)    Physical :  Gen- NAD  Cardio - S1/S2 present  Lung - cta b/l  Abdomen- +BS, NT/ND, no guarding, no rebound, no masses  Ext- mild lower extremity edema, 2+ pulses b/l  Genitourinary: Phelps draining ~350mL red-colored urine, no gross blood clots    Assessment/Plan  89yo M with BPH, HFrEF, AVR admitted with JONATHAN due to urinary retention.  - Stat CBC- H/H 11/34; stable from admission (H/H 11.2/33.3)  - Stat BMP- BUN/Cr 46, 1.4; JONATHAN improving  - Stat PT 16.7, PTT 29.9, INR 1.42  - Stat type & screen, FOBT, ferritin, serum folate, iron with TIBC  - IV protonix 40mg IV x1  - Home dose Eliquis 2.5mg initially held- will resume Eliquis 2.5mg BID as pt does not present with acute signs of bleeding  - Continue to monitor Phelps output  - RN to call with changes. Called by RN for hematuria, and dark stool. Pt evaluated at bedside, states that he feels well. No longer constipated as he had a BM today following administration of Miralax, fleet enema. Reports that he experienced a similar episode of hematuria the previous time he had a Phelps catheter. Denies any abdominal pain, pelvic pain, gross blood in stool. Denies lightheadedness, shortness of breath, chest pain, palpitations.    T(C): 37 (07-21-23 @ 11:40), Max: 37 (07-21-23 @ 11:40)  HR: 87 (07-21-23 @ 11:40) (70 - 87)  BP: 108/63 (07-21-23 @ 11:40) (108/63 - 135/62)  RR: 18 (07-21-23 @ 11:40) (16 - 18)  SpO2: 95% (07-21-23 @ 11:40) (94% - 98%)    Physical :  Gen- NAD  Cardio - S1/S2 present  Lung - cta b/l  Abdomen- +BS, NT/ND, no guarding, no rebound, no masses  Ext- mild lower extremity edema, 2+ pulses b/l  Genitourinary: Phelps draining ~350mL red-colored urine, no gross active bleeding or blood clots    Assessment/Plan  89yo M with BPH, HFrEF, AVR admitted with JONATHAN due to urinary retention.  RN called for hematuria and dark stools.    - Stat CBC- H/H 11/34; stable from admission (H/H 11.2/33.3)  - Stat BMP- BUN/Cr 46, 1.4; JONATHAN improving  - Stat PT 16.7, PTT 29.9, INR 1.42  - ?melenas. f/u FOBT, anemia workup.    - Type & screen ordered.  - IV protonix 40mg IV x1  - Continue Eliquis 2.5mg BID as pt does not present with acute signs of active bleeding.   - Continue to monitor Phelps output. Hematuria suspected from Phelps placement.   - RN to call with changes.

## 2023-07-21 NOTE — H&P ADULT - PROBLEM SELECTOR PLAN 1
Pt with hx of BPH with LUTS presents with abd and pain dec urine outpatient s/p bautista with drainage of ~2L urine and resolution of abd pain  - CT A/P: Enlarged prostate gland with mild bilateral hydroureteronephrosis and perinephric stranding.   - urinary tract obstruction likely secondary to prostatomegaly  - continue indwelling bautista  - continue home doxazosin   - monitor Is and Os  - does not follow urology outpatient currently  - low suspicion of UTI at this time, no leukocytosis, afebrile, f/u urine and blood cx, monitor off abx Pt with hx of BPH with LUTS presents with abd and pain dec urine outpatient s/p bautista with drainage of ~2L urine and resolution of abd pain  - CT A/P: Enlarged prostate gland with mild bilateral hydroureteronephrosis and perinephric stranding.   - urinary tract obstruction likely secondary to prostatomegaly  - continue indwelling bautista  - continue home doxazosin   - monitor Is and Os  - low suspicion of UTI at this time, no leukocytosis, afebrile, f/u urine and blood cx, monitor off abx  - does not follow urology outpatient currently, consider urology consult in AM

## 2023-07-21 NOTE — PATIENT PROFILE ADULT - LEGAL HELP
EMERGENCY DEPARTMENT HISTORY AND PHYSICAL EXAM    2:24 PM      Date: 6/22/2018  Patient Name: Ganga Inman    History of Presenting Illness     Chief Complaint   Patient presents with    Hypotension    Fatigue         History Provided By: Patient    Chief Complaint: Weakness  Duration:  1 Day  Timing:  Constant  Location: Generalized  Severity: Moderate  Modifying Factors: Took insulin no relief. Associated Symptoms: Tingling lips, hypotension      Additional History (Context): Ganga Inman is a 37 y.o. female with a PMHx of HTN, depression, MS, diabetes presents with constant generalized, moderate weakness onset x 1 day ago. Associated Sx include tingling lips and hypotension. She said she took her antihypertensive and antidepressant medication this morning. Pt states she took insulin to see if this would resolved this issue and found no relief. Pt went to receive back surgery and was hypotensive which is when she was recommended to go seek further medical assistance. Denies having cough, vomiting, diarrhea, dysuria, hematuria. No other concerns or symptoms at this time. PCP: Wally Chamberlain MD    Current Outpatient Prescriptions   Medication Sig Dispense Refill    ibuprofen (MOTRIN) 600 mg tablet Take 1 Tab by mouth every six (6) hours as needed for Pain. 20 Tab 0    gabapentin (NEURONTIN) 300 mg capsule Take 300 mg by mouth nightly. 1-2 tabs      promethazine (PHENERGAN) 25 mg tablet Take 25 mg by mouth every six (6) hours as needed for Nausea.  amLODIPine (NORVASC) 10 mg tablet Take 10 mg by mouth daily.  metFORMIN (GLUCOPHAGE) 500 mg tablet Take 1.5 Tabs by mouth two (2) times daily (with meals). 60 Tab 0    DULoxetine (CYMBALTA) 60 mg capsule Take 60 mg by mouth daily.  QUEtiapine (SEROQUEL) 300 mg tablet Take 300 mg by mouth nightly.  glatiramer (COPAXONE) 40 mg/mL injection 40 mg by SubCUTAneous route every Monday, Wednesday, Friday.          Past History     Past Medical History:  Past Medical History:   Diagnosis Date    Arthropathy, unspecified, site unspecified     Depression     Diabetes [facility] [address]     Hypercholesteremia     Hypertension     history of htn    Incontinence of urine     Insomnia     Migraine     MS (multiple sclerosis) ([facility] [address]     Neurogenic bladder     Other ill-defined conditions(799.89)     multiple Sclerosis    Seizures ([facility] [address]     6/2013    Wears glasses        Past Surgical History:  Past Surgical History:   Procedure Laterality Date    HX CARPAL TUNNEL RELEASE  11/2008    HX HYSTERECTOMY      HX OTHER SURGICAL         Family History:  Family History   Problem Relation Age of Onset    Heart Disease Father     Diabetes Father     Diabetes Sister     Other Other        Social History:  Social History   Substance Use Topics    Smoking status: Never Smoker    Smokeless tobacco: Never Used    Alcohol use No       Allergies: Allergies   Allergen Reactions    Levemir [Insulin Detemir] Hives    Oxycontin [Oxycodone] Hives and Other (comments)     intolerance         Review of Systems     Review of Systems   Constitutional: Negative for diaphoresis and fever. HENT: Negative for congestion and sore throat. Tingling lips. Eyes: Negative for pain and itching. Respiratory: Negative for cough and shortness of breath. Cardiovascular: Negative for chest pain and palpitations. Gastrointestinal: Negative for abdominal pain and diarrhea. Endocrine: Negative for polydipsia and polyuria. Genitourinary: Negative for dysuria and hematuria. Musculoskeletal: Negative for arthralgias and myalgias. Skin: Negative for rash and wound. Neurological: Positive for weakness. Negative for seizures and syncope. Hematological: Does not bruise/bleed easily. Psychiatric/Behavioral: Negative for agitation and hallucinations.          Physical Exam     Visit Vitals    /59    Pulse 67    Temp 97.6 °F (36.4 °C)    Resp 13    Ht 5' 3\" (1.6 m)    Wt 106.6 kg (235 lb)    SpO2 99%    BMI 41.63 kg/m2       Physical Exam   Constitutional: She appears well-developed and well-nourished. HENT:   Head: Normocephalic and atraumatic. Eyes: Conjunctivae are normal. No scleral icterus. Neck: Normal range of motion. Neck supple. No JVD present. Cardiovascular: Normal rate, regular rhythm and normal heart sounds. 4 intact extremity pulses   Pulmonary/Chest: Effort normal and breath sounds normal.   Abdominal: Soft. She exhibits no mass. There is no tenderness. Musculoskeletal: Normal range of motion. Lymphadenopathy:     She has no cervical adenopathy. Neurological: She is alert. Skin: Skin is warm and dry. Nursing note and vitals reviewed. Diagnostic Study Results   Labs -  Recent Results (from the past 12 hour(s))   EKG, 12 LEAD, INITIAL    Collection Time: 06/22/18  1:58 PM   Result Value Ref Range    Ventricular Rate 58 BPM    Atrial Rate 58 BPM    P-R Interval 176 ms    QRS Duration 92 ms    Q-T Interval 424 ms    QTC Calculation (Bezet) 416 ms    Calculated P Axis 49 degrees    Calculated R Axis 12 degrees    Calculated T Axis 18 degrees    Diagnosis       Sinus bradycardia  Nonspecific ST and T wave abnormality  Abnormal ECG  When compared with ECG of 27-DEC-2017 13:59,  Vent. rate has decreased BY  35 BPM     CBC WITH AUTOMATED DIFF    Collection Time: 06/22/18  2:31 PM   Result Value Ref Range    WBC 6.7 4.6 - 13.2 K/uL    RBC 4.45 4.20 - 5.30 M/uL    HGB 12.8 12.0 - 16.0 g/dL    HCT 36.1 35.0 - 45.0 %    MCV 81.1 74.0 - 97.0 FL    MCH 28.8 24.0 - 34.0 PG    MCHC 35.5 31.0 - 37.0 g/dL    RDW 13.3 11.6 - 14.5 %    PLATELET 182 (H) 465 - 420 K/uL    MPV 10.0 9.2 - 11.8 FL    NEUTROPHILS 51 40 - 73 %    LYMPHOCYTES 40 21 - 52 %    MONOCYTES 8 3 - 10 %    EOSINOPHILS 1 0 - 5 %    BASOPHILS 0 0 - 2 %    ABS. NEUTROPHILS 3.4 1.8 - 8.0 K/UL    ABS. LYMPHOCYTES 2.7 0.9 - 3.6 K/UL    ABS. MONOCYTES 0.5 0.05 - 1.2 K/UL    ABS. EOSINOPHILS 0.1 0.0 - 0.4 K/UL    ABS. BASOPHILS 0.0 0.0 - 0.06 K/UL    DF AUTOMATED     METABOLIC PANEL, COMPREHENSIVE    Collection Time: 06/22/18  2:31 PM   Result Value Ref Range    Sodium 135 (L) 136 - 145 mmol/L    Potassium 3.9 3.5 - 5.5 mmol/L    Chloride 101 100 - 108 mmol/L    CO2 23 21 - 32 mmol/L    Anion gap 11 3.0 - 18 mmol/L    Glucose 181 (H) 74 - 99 mg/dL    BUN 17 7.0 - 18 MG/DL    Creatinine 1.51 (H) 0.6 - 1.3 MG/DL    BUN/Creatinine ratio 11 (L) 12 - 20      GFR est AA 46 (L) >60 ml/min/1.73m2    GFR est non-AA 38 (L) >60 ml/min/1.73m2    Calcium 9.3 8.5 - 10.1 MG/DL    Bilirubin, total 0.7 0.2 - 1.0 MG/DL    ALT (SGPT) 58 (H) 13 - 56 U/L    AST (SGOT) 36 15 - 37 U/L    Alk. phosphatase 80 45 - 117 U/L    Protein, total 7.3 6.4 - 8.2 g/dL    Albumin 3.8 3.4 - 5.0 g/dL    Globulin 3.5 2.0 - 4.0 g/dL    A-G Ratio 1.1 0.8 - 1.7     TSH 3RD GENERATION    Collection Time: 06/22/18  2:31 PM   Result Value Ref Range    TSH 1.05 0.36 - 3.74 uIU/mL   URINALYSIS W/ RFLX MICROSCOPIC    Collection Time: 06/22/18  4:30 PM   Result Value Ref Range    Color YELLOW      Appearance CLOUDY      Specific gravity 1.006 1.005 - 1.030      pH (UA) 6.0 5.0 - 8.0      Protein NEGATIVE  NEG mg/dL    Glucose NEGATIVE  NEG mg/dL    Ketone NEGATIVE  NEG mg/dL    Bilirubin NEGATIVE  NEG      Blood NEGATIVE  NEG      Urobilinogen 0.2 0.2 - 1.0 EU/dL    Nitrites NEGATIVE  NEG      Leukocyte Esterase NEGATIVE  NEG         Radiologic Studies -   No orders to display     No results found. Medications ordered:   Medications   sodium chloride 0.9 % bolus infusion 1,000 mL (0 mL IntraVENous IV Completed 6/22/18 1650)         Medical Decision Making   Initial Medical Decision Making and DDx:  No complaint of pain, cough, dysuria to focus exam. Do not suspect sepsis. Will get basic lab work and TSH. Possible side effect of antihypertensive medication  DDx: UTI, dehydration, hypothyroidism.     ED Course: Progress Notes, Reevaluation, and Consults:  5:02 PM Presentation is consistent with dehydration. Her creatinine level was elevated, subsequent BP has been better. She is feeling better. 5:07 PM Pt reevaluated at this time. Discussed results and findings, as well as, diagnosis and plan for discharge. Follow up with doctors/services listed. Return to the emergency department for alarming symptoms. Pt verbalizes understanding and agreement with plan. All questions addressed. I am the first provider for this patient. I reviewed the vital signs, available nursing notes, past medical history, past surgical history, family history and social history. Vital Signs-Reviewed the patient's vital signs. Pulse Oximetry Analysis - 99 % in room air, normal    EKG: Interpreted by the EP. Time Interpreted: 13:58 PM   Rate: 58 bpm   Rhythm: Sinus bradycardia   Interpretation: No acute process    Records Reviewed: Nursing Notes and Old Medical Records (Time of Review: 2:24 PM)      Diagnosis     Clinical Impression:   1. Hypotension, unspecified hypotension type    2. Dehydration        Disposition: DC    Follow-up Information     Follow up With Details Comments Wayne Villanueva MD In 2 days  [address]  [phone]             Patient's Medications   Start Taking    No medications on file   Continue Taking    AMLODIPINE (NORVASC) 10 MG TABLET    Take 10 mg by mouth daily. DULOXETINE (CYMBALTA) 60 MG CAPSULE    Take 60 mg by mouth daily. GABAPENTIN (NEURONTIN) 300 MG CAPSULE    Take 300 mg by mouth nightly. 1-2 tabs    GLATIRAMER (COPAXONE) 40 MG/ML INJECTION    40 mg by SubCUTAneous route every Monday, Wednesday, Friday. IBUPROFEN (MOTRIN) 600 MG TABLET    Take 1 Tab by mouth every six (6) hours as needed for Pain. METFORMIN (GLUCOPHAGE) 500 MG TABLET    Take 1.5 Tabs by mouth two (2) times daily (with meals).     PROMETHAZINE (PHENERGAN) 25 MG TABLET Take 25 mg by mouth every six (6) hours as needed for Nausea. QUETIAPINE (SEROQUEL) 300 MG TABLET    Take 300 mg by mouth nightly. These Medications have changed    No medications on file   Stop Taking    No medications on file     _______________________________    Attestations:  [address] and Celine Appl acting as a scribe for and in the presence of Danielle Merino MD      June 22, 2018 at 2:24 PM       Provider Attestation:      I personally performed the services described in the documentation, reviewed the documentation, as recorded by the scribe in my presence, and it accurately and completely records my words and actions.  June 22, 2018 at 2:24 PM - Danielle Merino MD    _______________________________ no

## 2023-07-22 LAB
ANION GAP SERPL CALC-SCNC: 5 MMOL/L — SIGNIFICANT CHANGE UP (ref 5–17)
APPEARANCE UR: CLEAR — SIGNIFICANT CHANGE UP
BASOPHILS # BLD AUTO: 0.02 K/UL — SIGNIFICANT CHANGE UP (ref 0–0.2)
BASOPHILS NFR BLD AUTO: 0.4 % — SIGNIFICANT CHANGE UP (ref 0–2)
BILIRUB UR-MCNC: NEGATIVE — SIGNIFICANT CHANGE UP
BUN SERPL-MCNC: 40 MG/DL — HIGH (ref 7–23)
CALCIUM SERPL-MCNC: 8.9 MG/DL — SIGNIFICANT CHANGE UP (ref 8.5–10.1)
CHLORIDE SERPL-SCNC: 112 MMOL/L — HIGH (ref 96–108)
CO2 SERPL-SCNC: 24 MMOL/L — SIGNIFICANT CHANGE UP (ref 22–31)
COLOR SPEC: ABNORMAL
CREAT SERPL-MCNC: 1.2 MG/DL — SIGNIFICANT CHANGE UP (ref 0.5–1.3)
DIFF PNL FLD: ABNORMAL
EGFR: 56 ML/MIN/1.73M2 — LOW
EOSINOPHIL # BLD AUTO: 0.15 K/UL — SIGNIFICANT CHANGE UP (ref 0–0.5)
EOSINOPHIL NFR BLD AUTO: 3 % — SIGNIFICANT CHANGE UP (ref 0–6)
FERRITIN SERPL-MCNC: 169 NG/ML — SIGNIFICANT CHANGE UP (ref 30–400)
FOLATE SERPL-MCNC: 16.1 NG/ML — SIGNIFICANT CHANGE UP
GLUCOSE SERPL-MCNC: 100 MG/DL — HIGH (ref 70–99)
GLUCOSE UR QL: NEGATIVE MG/DL — SIGNIFICANT CHANGE UP
HCT VFR BLD CALC: 30 % — LOW (ref 39–50)
HCT VFR BLD CALC: 30.8 % — LOW (ref 39–50)
HGB BLD-MCNC: 10 G/DL — LOW (ref 13–17)
HGB BLD-MCNC: 10.1 G/DL — LOW (ref 13–17)
IMM GRANULOCYTES NFR BLD AUTO: 0.2 % — SIGNIFICANT CHANGE UP (ref 0–0.9)
IRON SATN MFR SERPL: 23 UG/DL — LOW (ref 45–165)
IRON SATN MFR SERPL: 9 % — LOW (ref 16–55)
KETONES UR-MCNC: NEGATIVE MG/DL — SIGNIFICANT CHANGE UP
LEUKOCYTE ESTERASE UR-ACNC: ABNORMAL
LYMPHOCYTES # BLD AUTO: 1.6 K/UL — SIGNIFICANT CHANGE UP (ref 1–3.3)
LYMPHOCYTES # BLD AUTO: 32 % — SIGNIFICANT CHANGE UP (ref 13–44)
MAGNESIUM SERPL-MCNC: 2.2 MG/DL — SIGNIFICANT CHANGE UP (ref 1.6–2.6)
MCHC RBC-ENTMCNC: 28.8 PG — SIGNIFICANT CHANGE UP (ref 27–34)
MCHC RBC-ENTMCNC: 33.3 GM/DL — SIGNIFICANT CHANGE UP (ref 32–36)
MCV RBC AUTO: 86.5 FL — SIGNIFICANT CHANGE UP (ref 80–100)
MONOCYTES # BLD AUTO: 0.49 K/UL — SIGNIFICANT CHANGE UP (ref 0–0.9)
MONOCYTES NFR BLD AUTO: 9.8 % — SIGNIFICANT CHANGE UP (ref 2–14)
NEUTROPHILS # BLD AUTO: 2.73 K/UL — SIGNIFICANT CHANGE UP (ref 1.8–7.4)
NEUTROPHILS NFR BLD AUTO: 54.6 % — SIGNIFICANT CHANGE UP (ref 43–77)
NITRITE UR-MCNC: NEGATIVE — SIGNIFICANT CHANGE UP
NRBC # BLD: 0 /100 WBCS — SIGNIFICANT CHANGE UP (ref 0–0)
PH UR: 5.5 — SIGNIFICANT CHANGE UP (ref 5–8)
PHOSPHATE SERPL-MCNC: 2.9 MG/DL — SIGNIFICANT CHANGE UP (ref 2.5–4.5)
PLATELET # BLD AUTO: 143 K/UL — LOW (ref 150–400)
POTASSIUM SERPL-MCNC: 3.4 MMOL/L — LOW (ref 3.5–5.3)
POTASSIUM SERPL-SCNC: 3.4 MMOL/L — LOW (ref 3.5–5.3)
PROT UR-MCNC: 30 MG/DL
RBC # BLD: 3.47 M/UL — LOW (ref 4.2–5.8)
RBC # FLD: 14.3 % — SIGNIFICANT CHANGE UP (ref 10.3–14.5)
SODIUM SERPL-SCNC: 141 MMOL/L — SIGNIFICANT CHANGE UP (ref 135–145)
SP GR SPEC: 1.01 — SIGNIFICANT CHANGE UP (ref 1–1.03)
TIBC SERPL-MCNC: 242 UG/DL — SIGNIFICANT CHANGE UP (ref 220–430)
TRANSFERRIN SERPL-MCNC: 171 MG/DL — LOW (ref 200–360)
UIBC SERPL-MCNC: 219 UG/DL — SIGNIFICANT CHANGE UP (ref 110–370)
UROBILINOGEN FLD QL: 0.2 MG/DL — SIGNIFICANT CHANGE UP (ref 0.2–1)
VIT B12 SERPL-MCNC: 423 PG/ML — SIGNIFICANT CHANGE UP (ref 232–1245)
WBC # BLD: 5 K/UL — SIGNIFICANT CHANGE UP (ref 3.8–10.5)
WBC # FLD AUTO: 5 K/UL — SIGNIFICANT CHANGE UP (ref 3.8–10.5)

## 2023-07-22 RX ORDER — POTASSIUM CHLORIDE 20 MEQ
40 PACKET (EA) ORAL ONCE
Refills: 0 | Status: COMPLETED | OUTPATIENT
Start: 2023-07-22 | End: 2023-07-22

## 2023-07-22 RX ADMIN — APIXABAN 2.5 MILLIGRAM(S): 2.5 TABLET, FILM COATED ORAL at 05:52

## 2023-07-22 RX ADMIN — Medication 40 MILLIEQUIVALENT(S): at 17:32

## 2023-07-22 RX ADMIN — SENNA PLUS 2 TABLET(S): 8.6 TABLET ORAL at 22:04

## 2023-07-22 RX ADMIN — Medication 4 MILLIGRAM(S): at 22:04

## 2023-07-22 RX ADMIN — APIXABAN 2.5 MILLIGRAM(S): 2.5 TABLET, FILM COATED ORAL at 17:32

## 2023-07-22 NOTE — CARE COORDINATION ASSESSMENT. - NSDCPLANREVIEW_GEN_ALL_CORE
Patient was seen for assessment to be alert and oriented. He was seen with two daughters by bedside. Pt has not recommended him for Sub Acute rehab but the family is requesting a referral. [facility] is first choice. They were provided the with a list of rehabs and requested choices in preference order. The patient lives alone and has multiple levels in his home. Her uses a cane. He is somewhat reluctant to go to rehab but agreeable. Educated them on discharge planning process. o request BRITT./Patient

## 2023-07-22 NOTE — CARE COORDINATION ASSESSMENT. - NSCAREPROVIDERS_GEN_ALL_CORE_FT
CARE PROVIDERS:  Accepting Physician: Vinnie Live  Admitting: Vinnie Live  Attending: Vinnie Live  Case Management: Earle So  Consultant: Weil, Patricia  Consultant: Bernardo Wilcox  Consultant: Tommy Araujo  Consultant: Ty Blackman  Consultant: Bon San  Covering Team: Danelle Strickland  Covering Team: Cheli Casillas ED Attending: Michael Mcdonald  ED Nurse: Odessa Solis  HIM/Billing & Coding: Polnia Elaine  Nurse: Selene Yeager  Ordered: ServiceAccount, SCMMLM  Ordered: Doctor, Unknown  Ordered: ADM, User  Outpatient Provider: Miko Page  Outpatient Provider: Diogo Cyr  PCA/Nursing Assistant: Mirta Kendrick  Registered Dietitian: Mallorie Aguilar  Respiratory Therapy: Adam Kaminski  : Polina Curry

## 2023-07-22 NOTE — CARE COORDINATION ASSESSMENT. - NSPASTMEDSURGHISTORY_GEN_ALL_CORE_FT
PAST MEDICAL & SURGICAL HISTORY:  Squamous cell carcinoma of skin      CAD (coronary artery disease)      Atrial fibrillation      HLD (hyperlipidemia)      HTN (hypertension)      BPH (benign prostatic hyperplasia)      HFrEF (heart failure with reduced ejection fraction)      S/P implantation of automatic cardioverter/defibrillator (AICD)      S/P AVR

## 2023-07-23 LAB
ANION GAP SERPL CALC-SCNC: 5 MMOL/L — SIGNIFICANT CHANGE UP (ref 5–17)
BUN SERPL-MCNC: 28 MG/DL — HIGH (ref 7–23)
CALCIUM SERPL-MCNC: 8.8 MG/DL — SIGNIFICANT CHANGE UP (ref 8.5–10.1)
CHLORIDE SERPL-SCNC: 111 MMOL/L — HIGH (ref 96–108)
CO2 SERPL-SCNC: 25 MMOL/L — SIGNIFICANT CHANGE UP (ref 22–31)
CREAT ?TM UR-MCNC: 29 MG/DL — SIGNIFICANT CHANGE UP
CREAT SERPL-MCNC: 1.1 MG/DL — SIGNIFICANT CHANGE UP (ref 0.5–1.3)
EGFR: 63 ML/MIN/1.73M2 — SIGNIFICANT CHANGE UP
GLUCOSE SERPL-MCNC: 147 MG/DL — HIGH (ref 70–99)
HCT VFR BLD CALC: 31.3 % — LOW (ref 39–50)
HGB BLD-MCNC: 10.6 G/DL — LOW (ref 13–17)
MCHC RBC-ENTMCNC: 28.8 PG — SIGNIFICANT CHANGE UP (ref 27–34)
MCHC RBC-ENTMCNC: 33.9 GM/DL — SIGNIFICANT CHANGE UP (ref 32–36)
MCV RBC AUTO: 85.1 FL — SIGNIFICANT CHANGE UP (ref 80–100)
NRBC # BLD: 0 /100 WBCS — SIGNIFICANT CHANGE UP (ref 0–0)
OB PNL STL: NEGATIVE — SIGNIFICANT CHANGE UP
OSMOLALITY UR: 241 MOSM/KG — SIGNIFICANT CHANGE UP (ref 50–1200)
PLATELET # BLD AUTO: 150 K/UL — SIGNIFICANT CHANGE UP (ref 150–400)
POTASSIUM SERPL-MCNC: 4 MMOL/L — SIGNIFICANT CHANGE UP (ref 3.5–5.3)
POTASSIUM SERPL-SCNC: 4 MMOL/L — SIGNIFICANT CHANGE UP (ref 3.5–5.3)
POTASSIUM UR-SCNC: 14.9 MMOL/L — SIGNIFICANT CHANGE UP
PROT ?TM UR-MCNC: 24 MG/DL — HIGH (ref 0–12)
PROT/CREAT UR-RTO: 0.9 RATIO — HIGH (ref 0–0.2)
RBC # BLD: 3.68 M/UL — LOW (ref 4.2–5.8)
RBC # FLD: 14.1 % — SIGNIFICANT CHANGE UP (ref 10.3–14.5)
SODIUM SERPL-SCNC: 141 MMOL/L — SIGNIFICANT CHANGE UP (ref 135–145)
SODIUM UR-SCNC: <20 MMOL/L — SIGNIFICANT CHANGE UP
UUN UR-MCNC: 451 MG/DL — SIGNIFICANT CHANGE UP
WBC # BLD: 6.12 K/UL — SIGNIFICANT CHANGE UP (ref 3.8–10.5)
WBC # FLD AUTO: 6.12 K/UL — SIGNIFICANT CHANGE UP (ref 3.8–10.5)

## 2023-07-23 RX ORDER — POLYETHYLENE GLYCOL 3350 17 G/17G
17 POWDER, FOR SOLUTION ORAL DAILY
Refills: 0 | Status: DISCONTINUED | OUTPATIENT
Start: 2023-07-23 | End: 2023-07-24

## 2023-07-23 RX ADMIN — Medication 10 MILLIGRAM(S): at 22:22

## 2023-07-23 RX ADMIN — APIXABAN 2.5 MILLIGRAM(S): 2.5 TABLET, FILM COATED ORAL at 06:29

## 2023-07-23 RX ADMIN — ATENOLOL 25 MILLIGRAM(S): 25 TABLET ORAL at 06:29

## 2023-07-23 RX ADMIN — Medication 4 MILLIGRAM(S): at 22:22

## 2023-07-23 RX ADMIN — APIXABAN 2.5 MILLIGRAM(S): 2.5 TABLET, FILM COATED ORAL at 18:06

## 2023-07-24 ENCOUNTER — TRANSCRIPTION ENCOUNTER (OUTPATIENT)
Age: 88
End: 2023-07-24

## 2023-07-24 VITALS
OXYGEN SATURATION: 93 % | TEMPERATURE: 98 F | HEART RATE: 68 BPM | DIASTOLIC BLOOD PRESSURE: 61 MMHG | SYSTOLIC BLOOD PRESSURE: 125 MMHG | RESPIRATION RATE: 18 BRPM

## 2023-07-24 DIAGNOSIS — E11.9 TYPE 2 DIABETES MELLITUS WITHOUT COMPLICATIONS: ICD-10-CM

## 2023-07-24 LAB
CULTURE RESULTS: NO GROWTH — SIGNIFICANT CHANGE UP
SPECIMEN SOURCE: SIGNIFICANT CHANGE UP

## 2023-07-24 PROCEDURE — 84484 ASSAY OF TROPONIN QUANT: CPT

## 2023-07-24 PROCEDURE — 99285 EMERGENCY DEPT VISIT HI MDM: CPT

## 2023-07-24 PROCEDURE — 83036 HEMOGLOBIN GLYCOSYLATED A1C: CPT

## 2023-07-24 PROCEDURE — 83540 ASSAY OF IRON: CPT

## 2023-07-24 PROCEDURE — 87040 BLOOD CULTURE FOR BACTERIA: CPT

## 2023-07-24 PROCEDURE — 85027 COMPLETE CBC AUTOMATED: CPT

## 2023-07-24 PROCEDURE — 80053 COMPREHEN METABOLIC PANEL: CPT

## 2023-07-24 PROCEDURE — 86850 RBC ANTIBODY SCREEN: CPT

## 2023-07-24 PROCEDURE — 82553 CREATINE MB FRACTION: CPT

## 2023-07-24 PROCEDURE — 51702 INSERT TEMP BLADDER CATH: CPT

## 2023-07-24 PROCEDURE — 84443 ASSAY THYROID STIM HORMONE: CPT

## 2023-07-24 PROCEDURE — 97116 GAIT TRAINING THERAPY: CPT

## 2023-07-24 PROCEDURE — 83935 ASSAY OF URINE OSMOLALITY: CPT

## 2023-07-24 PROCEDURE — 82607 VITAMIN B-12: CPT

## 2023-07-24 PROCEDURE — 84100 ASSAY OF PHOSPHORUS: CPT

## 2023-07-24 PROCEDURE — 82272 OCCULT BLD FECES 1-3 TESTS: CPT

## 2023-07-24 PROCEDURE — 84156 ASSAY OF PROTEIN URINE: CPT

## 2023-07-24 PROCEDURE — 82728 ASSAY OF FERRITIN: CPT

## 2023-07-24 PROCEDURE — 83735 ASSAY OF MAGNESIUM: CPT

## 2023-07-24 PROCEDURE — 85018 HEMOGLOBIN: CPT

## 2023-07-24 PROCEDURE — 97162 PT EVAL MOD COMPLEX 30 MIN: CPT

## 2023-07-24 PROCEDURE — 96361 HYDRATE IV INFUSION ADD-ON: CPT

## 2023-07-24 PROCEDURE — 84300 ASSAY OF URINE SODIUM: CPT

## 2023-07-24 PROCEDURE — 85014 HEMATOCRIT: CPT

## 2023-07-24 PROCEDURE — 86901 BLOOD TYPING SEROLOGIC RH(D): CPT

## 2023-07-24 PROCEDURE — 87086 URINE CULTURE/COLONY COUNT: CPT

## 2023-07-24 PROCEDURE — 84540 ASSAY OF URINE/UREA-N: CPT

## 2023-07-24 PROCEDURE — 85610 PROTHROMBIN TIME: CPT

## 2023-07-24 PROCEDURE — 96360 HYDRATION IV INFUSION INIT: CPT

## 2023-07-24 PROCEDURE — 71045 X-RAY EXAM CHEST 1 VIEW: CPT

## 2023-07-24 PROCEDURE — 85730 THROMBOPLASTIN TIME PARTIAL: CPT

## 2023-07-24 PROCEDURE — 81001 URINALYSIS AUTO W/SCOPE: CPT

## 2023-07-24 PROCEDURE — 83550 IRON BINDING TEST: CPT

## 2023-07-24 PROCEDURE — 86900 BLOOD TYPING SEROLOGIC ABO: CPT

## 2023-07-24 PROCEDURE — 99221 1ST HOSP IP/OBS SF/LOW 40: CPT

## 2023-07-24 PROCEDURE — 84133 ASSAY OF URINE POTASSIUM: CPT

## 2023-07-24 PROCEDURE — 82550 ASSAY OF CK (CPK): CPT

## 2023-07-24 PROCEDURE — 83690 ASSAY OF LIPASE: CPT

## 2023-07-24 PROCEDURE — 80061 LIPID PANEL: CPT

## 2023-07-24 PROCEDURE — 93005 ELECTROCARDIOGRAM TRACING: CPT

## 2023-07-24 PROCEDURE — 82746 ASSAY OF FOLIC ACID SERUM: CPT

## 2023-07-24 PROCEDURE — 83605 ASSAY OF LACTIC ACID: CPT

## 2023-07-24 PROCEDURE — 82570 ASSAY OF URINE CREATININE: CPT

## 2023-07-24 PROCEDURE — 84466 ASSAY OF TRANSFERRIN: CPT

## 2023-07-24 PROCEDURE — 74176 CT ABD & PELVIS W/O CONTRAST: CPT | Mod: MA

## 2023-07-24 PROCEDURE — 80048 BASIC METABOLIC PNL TOTAL CA: CPT

## 2023-07-24 PROCEDURE — 36415 COLL VENOUS BLD VENIPUNCTURE: CPT

## 2023-07-24 PROCEDURE — 83880 ASSAY OF NATRIURETIC PEPTIDE: CPT

## 2023-07-24 PROCEDURE — 85025 COMPLETE CBC W/AUTO DIFF WBC: CPT

## 2023-07-24 RX ORDER — DUTASTERIDE 0.5 MG/1
1 CAPSULE, LIQUID FILLED ORAL
Qty: 30 | Refills: 0
Start: 2023-07-24

## 2023-07-24 RX ORDER — ATENOLOL 25 MG/1
1 TABLET ORAL
Refills: 0 | DISCHARGE

## 2023-07-24 RX ORDER — POLYETHYLENE GLYCOL 3350 17 G/17G
17 POWDER, FOR SOLUTION ORAL
Qty: 0 | Refills: 0 | DISCHARGE
Start: 2023-07-24

## 2023-07-24 RX ORDER — METOPROLOL TARTRATE 50 MG
1 TABLET ORAL
Qty: 0 | Refills: 0 | DISCHARGE

## 2023-07-24 RX ORDER — DUTASTERIDE 0.5 MG/1
1 CAPSULE, LIQUID FILLED ORAL
Qty: 0 | Refills: 0 | DISCHARGE

## 2023-07-24 RX ADMIN — POLYETHYLENE GLYCOL 3350 17 GRAM(S): 17 POWDER, FOR SOLUTION ORAL at 13:42

## 2023-07-24 RX ADMIN — ATENOLOL 25 MILLIGRAM(S): 25 TABLET ORAL at 06:47

## 2023-07-24 RX ADMIN — APIXABAN 2.5 MILLIGRAM(S): 2.5 TABLET, FILM COATED ORAL at 06:47

## 2023-07-24 NOTE — SOCIAL WORK PROGRESS NOTE - NSSWPROGRESSNOTE_GEN_ALL_CORE
Patient to be discharged to [facility] rehab via [facility]. All I agreement. Copy of chart to follow

## 2023-07-24 NOTE — CONSULT NOTE ADULT - PROBLEM SELECTOR RECOMMENDATION 9
Type 2 A1c 6.9% adm: obstrctive and reflux uropathy  Recommend endocrine if persistent hyperglycemia -Perlman on consult  FU appt: TBA  DSC recommendations: return to home diet controlled regimen  diabetes education provided verbally and handout   Diabetes support info and cell # [phone] given   Goal 100-180 mg/dL; 140-180 mg/dL in critical care areas

## 2023-07-24 NOTE — PROGRESS NOTE ADULT - ASSESSMENT
89y male with PMHx of HFrEF s/p PPM/AICD, CAD s/p CABG x6, Atrial fibrillation (on eliquis), s/p AVR, HTN, HLD, BPH with LUTS, squamous cell carcinoma presented to the ED for the evaluation of abdominal pain and weakness associated with decreased urinary output and constipation for the past 4 days.     JONATHAN, urinary retention  no leukocytosis, afebrile  UA w/o pyuria  pt denies urinary symptoms, no flank pain  abd pain resolved s/p bautista placement  CT abd/pelvis- Enlarged prostate gland with mild bilateral hydroureteronephrosis and perinephric stranding.    Recommendations  continue off antibiotics  will ask Dr Cyr regarding Urology consult    From an ID standpoint no further requirement for inpatient status for the management of ID issues. Fine with discharge from ID standpoint when other medical issues no longer require inpatient care and social issues allow for a safe discharge plan. To schedule an outpatient ID follow up appointment please call our office at [phone]    Thank you for consulting us and involving us in the management of this most interesting and challenging case.  Please call us at [phone] or text me directly on my cell#[phone] with any concerns or further questions.  
JONATHAN on Likely CKD  Urinary Retention   Hyponatremia  HTN    -Unknown, but likely CKD given age and comorbidities  -JONATHAN likely 2/2 urinary retention, resolving   -Check Urine lytes - pending but not necessary abymore   -UA does not suggest GN. No indication for serologies or kidney biopsy   -Phelps placed  -Sodium correcting appropriately  -No indication for RRT  
JONATHAN on Likely CKD  Urinary Retention   Hyponatremia  HTN    -Unknown, but likely CKD given age and comorbidities  -JONATHAN likely 2/2 urinary retention, resolving   -Check Urine lytes - pending but not necessary anymore   -UA does not suggest GN. No indication for serologies or kidney biopsy   -Phelps placed  -Sodium correcting appropriately  -No indication for RRT  -Will need outpatient follow up    7/24/23-d/w family  
89m with BPH, HFrEF, AVR admitted with JONATHAN due to urinary retention  bautista in place, draining well  constipation relieved with bowel regimen and enema  continue doxazosin  previously on avodart, self-dced    JONATHAN  improving with bautista  avoid nephrotoxins  renal following    HF/AVR  continue atenolol and eliquis  need to clarify CHF regimen
JONATHAN  Hyponatremia  B/L Hydronephrosis  Pancreatic Mass?  Phelps placement  Check blood and urine studies  Consult to follow in AM
JONATHAN on Likely CKD  Urinary Retention   Hyponatremia  HTN    -Unknown, but likely CKD given age and comorbidities  -JONATHAN likely 2/2 urinary retention, resolving   -Check Urine lytes - pending   -UA does not suggest GN. No indication for serologies or kidney biopsy   -Phelps placed  -Sodium correcting appropriately  -No indication for RRT  
89m with BPH, HFrEF, AVR admitted with JONATHAN due to urinary retention  bautista in place, draining well  constipation relieved with bowel regimen and enema  continue doxazosin  previously on avodart, self-dced  voiding trial    JONATHAN  improving with bautista  avoid nephrotoxins  renal following    HF/AVR  continue atenolol and eliquis  need to clarify CHF regimen
89m with BPH, HFrEF, AVR admitted with JONATHAN due to urinary retention  bautista in place, draining well  continue doxazosin  previously on avodart, self-dced  voiding trial next week in rehab    constipation  increased bowel regimen    JONATHAN  improving with bautista  avoid nephrotoxins  renal following    HF/AVR  continue atenolol and eliquis  need to clarify CHF regimen    DC to rehab when bed available

## 2023-07-24 NOTE — DISCHARGE NOTE PROVIDER - NSDCMRMEDTOKEN_GEN_ALL_CORE_FT
bisacodyl 5 mg oral delayed release tablet: 2 tab(s) orally once a day (at bedtime)  doxazosin 4 mg oral tablet: 1 tab(s) orally once a day (at bedtime)  dutasteride 0.5 mg oral capsule: 1 orally once a day  Eliquis 2.5 mg oral tablet: 1 tab(s) orally 2 times a day  Lasix 40 mg oral tablet: 1 tab(s) orally once a day  losartan 25 mg oral tablet: 1 orally once a day  polyethylene glycol 3350 oral powder for reconstitution: 17 gram(s) orally once a day  spironolactone 25 mg oral tablet: 1 tab(s) orally once a day  Toprol-XL 50 mg oral tablet, extended release: 1 orally once a day

## 2023-07-24 NOTE — DISCHARGE NOTE PROVIDER - PROVIDER TOKENS
PROVIDER:[TOKEN:[[id number]]],PROVIDER:[TOKEN:[[id number]]],PROVIDER:[TOKEN:[[id number]]]

## 2023-07-24 NOTE — CONSULT NOTE ADULT - SUBJECTIVE AND OBJECTIVE BOX
Patient is a 89y old  Male who presents with a chief complaint of Urinary obstruction/JONATHAN (21 Jul 2023 01:17)       HPI:  89y male with PMHx of HFrEF s/p PPM/AICD, CAD s/p CABG x6, Atrial fibrillation (on eliquis), s/p AVR, HTN, HLD, BPH with LUTS, squamous cell carcinoma presented to the ED for the evaluation of abdominal pain and weakness associated with decreased urinary output and constipation for the past 4 days. Patient developed nausea for the past 2 days and two episodes of NBNB vomiting. Patient and daughter denies fever, chills, CP, SOB, dysuria, hematuria, diarrhea. Patient states after bautista was placed in ED there was ~2L of drainage and symptoms of abdominal pain resolved.   Has not seen nephrologist in the past.  Denies any NSAID usage.  Feeling better.    PAST MEDICAL & SURGICAL HISTORY:  HFrEF (heart failure with reduced ejection fraction)      BPH (benign prostatic hyperplasia)      HTN (hypertension)      HLD (hyperlipidemia)      Atrial fibrillation      CAD (coronary artery disease)      Squamous cell carcinoma of skin      S/P AVR      S/P implantation of automatic cardioverter/defibrillator (AICD)           FAMILY HISTORY:  FHx: heart disease (Father, Mother)    NC    Social History:Non smoker    MEDICATIONS  (STANDING):  apixaban 2.5 milliGRAM(s) Oral every 12 hours  atenolol  Tablet 25 milliGRAM(s) Oral daily  doxazosin 4 milliGRAM(s) Oral at bedtime  senna 2 Tablet(s) Oral at bedtime    MEDICATIONS  (PRN):  acetaminophen     Tablet .. 650 milliGRAM(s) Oral every 6 hours PRN Mild Pain (1 - 3)  melatonin 3 milliGRAM(s) Oral at bedtime PRN Insomnia  polyethylene glycol 3350 17 Gram(s) Oral daily PRN Constipation   Meds reviewed    Allergies    Percocet (Other)  Levaquin (Other)  Benadryl (Other)    Intolerances         REVIEW OF SYSTEMS:    Review of Systems:   Constitutional: Denies fatigue  HEENT: Denies headaches and dizziness  Respiratory: denies SOB, cough, or wheezing  Cardiovascular: denies CP, palpitations  Gastrointestinal: Denies nausea, denies vomiting, diarrhea, constipation, abdominal pain, or bloody stools  Genitourinary: denies painful urination, increased frequency, urgency, or bloody urine  Skin: denies rashes or itching  Musculoskeletal: denies muscle aches, joint swelling  Neurologic: Denies generalized weakness, denies loss of sensation, numbness, or tingling      Vital Signs Last 24 Hrs  T(C): 36.6 (21 Jul 2023 03:35), Max: 36.7 (20 Jul 2023 16:57)  T(F): 97.9 (21 Jul 2023 03:35), Max: 98 (20 Jul 2023 16:57)  HR: 70 (21 Jul 2023 03:35) (70 - 70)  BP: 135/62 (21 Jul 2023 03:35) (130/72 - 167/78)  BP(mean): --  RR: 17 (21 Jul 2023 03:35) (16 - 17)  SpO2: 94% (21 Jul 2023 03:35) (94% - 98%)    Parameters below as of 21 Jul 2023 03:35  Patient On (Oxygen Delivery Method): room air      Daily     Daily     PHYSICAL EXAM:    GENERAL: NAD  HEAD:  Atraumatic, Normocephalic  EYES: EOMI, conjunctiva and sclera clear  ENMT: No Drainage from nares, No drainage from ears  NECK: Supple, neck  veins full  NERVOUS SYSTEM:  Awake and Alert  CHEST/LUNG: Clear to percussion bilaterally; No rales, rhonchi, wheezing, or rubs  HEART: Regular rate and rhythm; No murmurs, rubs, or gallops  ABDOMEN: Soft, Nontender, Nondistended; Bowel sounds present  EXTREMITIES:  +Edema  SKIN: No rashes No obvious ecchymosis      LABS:                        11.2   6.23  )-----------( 135      ( 21 Jul 2023 06:27 )             33.6     07-21    140  |  109<H>  |  41<H>  ----------------------------<  93  3.6   |  23  |  1.80<H>    Ca    9.2      21 Jul 2023 06:27  Mg     2.3     07-20    TPro  6.1  /  Alb  3.1<L>  /  TBili  1.1  /  DBili  x   /  AST  14<L>  /  ALT  15  /  AlkPhos  46  07-21    PT/INR - ( 21 Jul 2023 06:27 )   PT: 17.1 sec;   INR: 1.46 ratio         PTT - ( 21 Jul 2023 06:27 )  PTT:24.4 sec  Urinalysis Basic - ( 21 Jul 2023 06:27 )    Color: x / Appearance: x / SG: x / pH: x  Gluc: 93 mg/dL / Ketone: x  / Bili: x / Urobili: x   Blood: x / Protein: x / Nitrite: x   Leuk Esterase: x / RBC: x / WBC x   Sq Epi: x / Non Sq Epi: x / Bacteria: x      Magnesium: 2.3 mg/dL (07-20 @ 19:05)          RADIOLOGY & ADDITIONAL TESTS:  
[facility], Division of Infectious Diseases  DAWN Fried S. Shah, Y. Patel, G. Alvin  [phone]  ([phone] - weekdays after 5pm and weekends)    TEODORO LAMAR  89y, Male  [id number]    HPI--  HPI:  89y male with PMHx of HFrEF s/p PPM/AICD, CAD s/p CABG x6, Atrial fibrillation (on eliquis), s/p AVR, HTN, HLD, BPH with LUTS, squamous cell carcinoma presented to the ED for the evaluation of abdominal pain and weakness associated with decreased urinary output and constipation for the past 4 days. Patient developed nausea for the past 2 days and two episodes of NBNB vomiting. Patient and daughter denies fever, chills, CP, SOB, dysuria, hematuria, diarrhea. Patient states after bautista was placed in ED there was ~2L of drainage and symptoms of abdominal pain resolved.     In the ED,  Vital Signs T(F): 98 HR: 70 BP: 167/78 RR: 16 SpO2: 98%  Labs significant for: H/H 11.2/32.4, INR 1.42, Na 132, BUN/Cr 51/2.70, proBNP 47724  CXR: increased markings with no effusion seen, left sided cardiac pacemaker in place on personal review  CT A/P: Enlarged prostate gland with mild bilateral hydroureteronephrosis and perinephric stranding. Subcentimeter pancreatic cystic lesions measuring up to 0.9 cm in the pancreatic neck. MRI/MRCP can be obtained for further evaluation. Trace bilateral pleural effusions with associated atelectasis at the lung bases. Cholelithiasis.  EKG: Vpaced at 70bpm (21 Jul 2023 00:07)    ROS: 14 point review of systems completed, pertinent positives and negatives as per HPI.    Allergies: Percocet (Other)  Levaquin (Other)  Benadryl (Other)    PMH -- HFrEF (heart failure with reduced ejection fraction)    BPH (benign prostatic hyperplasia)    HTN (hypertension)    HLD (hyperlipidemia)    Atrial fibrillation    CAD (coronary artery disease)    H/O squamous cell carcinoma of skin    Squamous cell carcinoma of skin      PSH -- S/P AVR    S/P implantation of automatic cardioverter/defibrillator (AICD)      FH -- No pertinent family history in first degree relatives    FHx: heart disease (Father, Mother)      Social History -- denies tobacco, alcohol or illicit drug use    Physical Exam--  Vital Signs Last 24 Hrs  T(F): 98.4 (22 Jul 2023 19:59), Max: 98.4 (22 Jul 2023 19:59)  HR: 70 (22 Jul 2023 19:59) (70 - 70)  BP: 143/64 (22 Jul 2023 19:59) (113/53 - 143/64)  RR: 18 (22 Jul 2023 19:59) (18 - 18)  SpO2: 98% (22 Jul 2023 19:59) (94% - 98%)  General: nontoxic-appearing, no acute distress  HEENT: NC/AT, anicteric  Neck: Not rigid. No LAD  Lungs: Clear bilaterally without rales  Heart: S1, S2, normal rate.  Abdomen: Soft. Nondistended. Nontender. no suprapubic tenderness  : bautista  Neuro: no obvious focal deficits   Back: No costovertebral angle tenderness.  Extremities: trace LE edema.   Skin: Warm. Dry. No rash.  Psychiatric: Appropriate affect and mood for situation.     Laboratory & Imaging Data--  CBC:                       10.1   x     )-----------( x        ( 22 Jul 2023 14:49 )             30.8     WBC Count: 5.00 K/uL (07-22-23 @ 06:32)  WBC Count: 6.51 K/uL (07-21-23 @ 21:27)  WBC Count: 6.23 K/uL (07-21-23 @ 06:27)  WBC Count: 9.87 K/uL (07-20-23 @ 19:05)    CMP: 07-22    141  |  112<H>  |  40<H>  ----------------------------<  100<H>  3.4<L>   |  24  |  1.20    Ca    8.9      22 Jul 2023 06:32  Phos  2.9     07-22  Mg     2.2     07-22    TPro  6.1  /  Alb  3.1<L>  /  TBili  1.1  /  DBili  x   /  AST  14<L>  /  ALT  15  /  AlkPhos  46  07-21    LIVER FUNCTIONS - ( 21 Jul 2023 06:27 )  Alb: 3.1 g/dL / Pro: 6.1 g/dL / ALK PHOS: 46 U/L / ALT: 15 U/L / AST: 14 U/L / GGT: x           Urinalysis Basic - ( 22 Jul 2023 06:32 )    Color: x / Appearance: x / SG: x / pH: x  Gluc: 100 mg/dL / Ketone: x  / Bili: x / Urobili: x   Blood: x / Protein: x / Nitrite: x   Leuk Esterase: x / RBC: x / WBC x   Sq Epi: x / Non Sq Epi: x / Bacteria: x      Microbiology:     Culture - Blood (collected 07-20-23 @ 21:50)  Source: .Blood Blood-Peripheral  Preliminary Report (07-22-23 @ 01:03):    No growth at 24 hours    Culture - Blood (collected 07-20-23 @ 21:40)  Source: .Blood Blood-Peripheral  Preliminary Report (07-22-23 @ 01:03):    No growth at 24 hours        Radiology--  ***  Active Medications--  acetaminophen     Tablet .. 650 milliGRAM(s) Oral every 6 hours PRN  apixaban 2.5 milliGRAM(s) Oral every 12 hours  atenolol  Tablet 25 milliGRAM(s) Oral daily  doxazosin 4 milliGRAM(s) Oral at bedtime  melatonin 3 milliGRAM(s) Oral at bedtime PRN  polyethylene glycol 3350 17 Gram(s) Oral daily PRN  senna 2 Tablet(s) Oral at bedtime    Antimicrobials:     Immunologic:   
Patient is a 89y old  Male who presents with a chief complaint of Urinary obstruction/JONATHAN (23 Jul 2023 17:05)    Type:2 new DX. a1c 6.9% no known complications. SW patient and dtr Pat at the bedside. reviewed patient's diet at home- in lieu of meals - will snack often of cookies, crackers. Has banana every day with oatmeal breakfast. reviewed with dtr healthy diet. No need to monitor- a1c for this age group according to ADA may be as high as 8%- conservative management only.   diabetes education provided      HPI:  89y male with PMHx of HFrEF s/p PPM/AICD, CAD s/p CABG x6, Atrial fibrillation (on eliquis), s/p AVR, HTN, HLD, BPH with LUTS, squamous cell carcinoma presented to the ED for the evaluation of abdominal pain and weakness associated with decreased urinary output and constipation for the past 4 days. Patient developed nausea for the past 2 days and two episodes of NBNB vomiting. Patient and daughter denies fever, chills, CP, SOB, dysuria, hematuria, diarrhea. Patient states after bauitsta was placed in ED there was ~2L of drainage and symptoms of abdominal pain resolved.     In the ED,  Vital Signs T(F): 98 HR: 70 BP: 167/78 RR: 16 SpO2: 98%  Labs significant for: H/H 11.2/32.4, INR 1.42, Na 132, BUN/Cr 51/2.70, proBNP 71280  CXR: increased markings with no effusion seen, left sided cardiac pacemaker in place on personal review  CT A/P: Enlarged prostate gland with mild bilateral hydroureteronephrosis and perinephric stranding. Subcentimeter pancreatic cystic lesions measuring up to 0.9 cm in the pancreatic neck. MRI/MRCP can be obtained for further evaluation. Trace bilateral pleural effusions with associated atelectasis at the lung bases. Cholelithiasis.  EKG: Vpaced at 70bpm (21 Jul 2023 00:07)      PAST MEDICAL & SURGICAL HISTORY:  HFrEF (heart failure with reduced ejection fraction)      BPH (benign prostatic hyperplasia)      HTN (hypertension)      HLD (hyperlipidemia)      Atrial fibrillation      CAD (coronary artery disease)      Squamous cell carcinoma of skin      S/P AVR      S/P implantation of automatic cardioverter/defibrillator (AICD)          Allergies    Percocet (Other)  Levaquin (Other)  Benadryl (Other)    Intolerances        MEDICATIONS  (STANDING):  apixaban 2.5 milliGRAM(s) Oral every 12 hours  atenolol  Tablet 25 milliGRAM(s) Oral daily  bisacodyl 10 milliGRAM(s) Oral at bedtime  doxazosin 4 milliGRAM(s) Oral at bedtime  polyethylene glycol 3350 17 Gram(s) Oral daily

## 2023-07-24 NOTE — DISCHARGE NOTE NURSING/CASE MANAGEMENT/SOCIAL WORK - PATIENT PORTAL LINK FT
You can access the FollowMyHealth Patient Portal offered by [facility] by registering at the following website: [email or web address]. By joining [facility]’s FollowMyHealth portal, you will also be able to view your health information using other applications (apps) compatible with our system.

## 2023-07-24 NOTE — DISCHARGE NOTE PROVIDER - CARE PROVIDER_API CALL
Miko Page  Internal Medicine  [address] [place] [address]  Phone: [phone]  Fax: [phone]  Follow Up Time:     Norma Alvarenga  Urology  [address], [address]  [place] [address]  Phone: [phone]  Fax: [phone]  Follow Up Time:     Gayle Wynne  Urology  [address] [place] [id number]  Phone: [phone]  Fax: [phone]  Follow Up Time:

## 2023-07-24 NOTE — DISCHARGE NOTE NURSING/CASE MANAGEMENT/SOCIAL WORK - NSDCPEFALRISK_GEN_ALL_CORE
For information on Fall & Injury Prevention, visit: [email or web address] OR  [email or web address] OR  https://www.cdc.gov/steadi/patient.html

## 2023-07-24 NOTE — GOALS OF CARE CONVERSATION - ADVANCED CARE PLANNING - CONVERSATION DETAILS
As per family request, PC team met with patient and daughters, Kathrien Root and Palma at bedside. Reviewed patient's medical and social history as well as events leading to patient's hospitalization. Writer discussed patient's current diagnosis (Urinary obstruction, JONATHAN HX of  HFrEF s/p PPM/AICD, CAD s/p CABG x6, Atrial fibrillation (on eliquis), s/p AVR, HTN, HLD, BPH with LUTS, squamous cell carcinoma), medical condition and management. Family has requested to see PC team as patient wanted to complete MOLST. Patient has a HCP in place with daughter Kathrine Root as health care agent.  Daughter stated that although they had requested PC team see patient to discuss MOLST, patient was overwhelmed about the conversation and did not want to complete it at this time. Patient was agreeable to reviewing MOLST form with team.  MOLST form explained including discussion of possible outcomes of CPR. Patient and family showed insight into patient's medical condition. All questions answered. Patient wants to review information provided with his significant other and family before making any decisions. PC team contact information provided. Psychosocial support provided.
normal...

## 2023-07-24 NOTE — CONSULT NOTE ADULT - ASSESSMENT
89y male with PMHx of HFrEF s/p PPM/AICD, CAD s/p CABG x6, Atrial fibrillation (on eliquis), s/p AVR, HTN, HLD, BPH with LUTS, squamous cell carcinoma presented to the ED for the evaluation of abdominal pain and weakness associated with decreased urinary output and constipation for the past 4 days.     JONATHAN, urinary retention  no leukocytosis, afebrile  UA w/o pyuria  pt denies urinary symptoms, no flank pain  abd pain resolved s/p bautista placement  CT abd/pelvis- Enlarged prostate gland with mild bilateral hydroureteronephrosis and perinephric stranding.  Recommendations  continue off antibiotics  f/u blood cultures- NGTD    Gene Esquivel M.D.  [facility], Division of Infectious Diseases  [phone]  After 5pm on weekdays and all day on weekends - please call [phone] 
JONATHAN on Likely CKD  Urinary Retention   Hyponatremia  HTN    -Unknown, but likely CKD given age and comobidities  -JONATHAN likely 2/2 urinary retention  -Check Urine lytes  -Check UA  -Phelps placed  -Sodium correcting appropriately  -No indication for RRT  
Physical Exam:   Vital Signs Last 24 Hrs  T(C): 36.6 (24 Jul 2023 05:39), Max: 36.8 (23 Jul 2023 20:18)  T(F): 97.9 (24 Jul 2023 05:39), Max: 98.3 (23 Jul 2023 20:18)  HR: 70 (24 Jul 2023 05:39) (70 - 70)  BP: 147/55 (24 Jul 2023 05:39) (113/57 - 147/62)  BP(mean): --  RR: 18 (24 Jul 2023 05:39) (18 - 18)  SpO2: 97% (24 Jul 2023 05:39) (96% - 97%)    Parameters below as of 24 Jul 2023 05:39  Patient On (Oxygen Delivery Method): room air             CAPILLARY BLOOD GLUCOSE          Cholesterol, Serum: 113 mg/dL (05.19.21 @ 08:36)     HDL Cholesterol, Serum: 22 mg/dL (05.19.21 @ 08:36)     LDL Cholesterol Calculated: 66 mg/dL (05.19.21 @ 08:36)     DIET: CC  >50%

## 2023-07-24 NOTE — PROGRESS NOTE ADULT - SUBJECTIVE AND OBJECTIVE BOX
Patient is a 89y old  Male who presents with a chief complaint of Urinary obstruction/JONATHAN (23 Jul 2023 09:18)        INTERVAL HPI/OVERNIGHT EVENTS:   constipated again  pt seen and examined         Vital Signs Last 24 Hrs  T(C): 36.8 (23 Jul 2023 20:18), Max: 36.8 (23 Jul 2023 20:18)  T(F): 98.3 (23 Jul 2023 20:18), Max: 98.3 (23 Jul 2023 20:18)  HR: 70 (23 Jul 2023 20:18) (70 - 70)  BP: 147/62 (23 Jul 2023 20:18) (113/57 - 147/62)  BP(mean): --  RR: 18 (23 Jul 2023 20:18) (18 - 18)  SpO2: 96% (23 Jul 2023 20:18) (95% - 96%)    Parameters below as of 23 Jul 2023 20:18  Patient On (Oxygen Delivery Method): room air        acetaminophen     Tablet .. 650 milliGRAM(s) Oral every 6 hours PRN  apixaban 2.5 milliGRAM(s) Oral every 12 hours  atenolol  Tablet 25 milliGRAM(s) Oral daily  bisacodyl 10 milliGRAM(s) Oral at bedtime  doxazosin 4 milliGRAM(s) Oral at bedtime  melatonin 3 milliGRAM(s) Oral at bedtime PRN  polyethylene glycol 3350 17 Gram(s) Oral daily      PHYSICAL EXAM:  GENERAL: NAD   EYES: conjunctiva and sclera clear  ENMT: Moist mucous membranes  NECK: Supple, No JVD, Normal thyroid  CHEST/LUNG: non labored, cta b/l  HEART: Regular rate and rhythm; No murmurs, rubs, or gallops  ABDOMEN: Soft, Nontender, Nondistended; Bowel sounds present  EXTREMITIES:  2+ Peripheral Pulses, No clubbing, cyanosis, or edema  LYMPH: No lymphadenopathy noted  SKIN: No rashes or lesions    Consultant(s) Notes Reviewed:  [x ] YES  [ ] NO  Care Discussed with Consultants/Other Providers [ x] YES  [ ] NO    LABS:                        10.6   6.12  )-----------( 150      ( 23 Jul 2023 09:11 )             31.3     07-23    141  |  111<H>  |  28<H>  ----------------------------<  147<H>  4.0   |  25  |  1.10    Ca    8.8      23 Jul 2023 09:11  Phos  2.9     07-22  Mg     2.2     07-22        Urinalysis Basic - ( 23 Jul 2023 09:11 )    Color: x / Appearance: x / SG: x / pH: x  Gluc: 147 mg/dL / Ketone: x  / Bili: x / Urobili: x   Blood: x / Protein: x / Nitrite: x   Leuk Esterase: x / RBC: x / WBC x   Sq Epi: x / Non Sq Epi: x / Bacteria: x      CAPILLARY BLOOD GLUCOSE            Urinalysis Basic - ( 23 Jul 2023 09:11 )    Color: x / Appearance: x / SG: x / pH: x  Gluc: 147 mg/dL / Ketone: x  / Bili: x / Urobili: x   Blood: x / Protein: x / Nitrite: x   Leuk Esterase: x / RBC: x / WBC x   Sq Epi: x / Non Sq Epi: x / Bacteria: x          RADIOLOGY & ADDITIONAL TESTS:    Imaging Personally Reviewed  Reviewed consultants input
Patient is a 89y old  Male who presents with a chief complaint of Urinary obstruction/JONATHAN (22 Jul 2023 09:00)        INTERVAL HPI/OVERNIGHT EVENTS:   moved bowels  pt seen and examined         Vital Signs Last 24 Hrs  T(C): 36.8 (22 Jul 2023 11:16), Max: 36.8 (22 Jul 2023 11:16)  T(F): 98.3 (22 Jul 2023 11:16), Max: 98.3 (22 Jul 2023 11:16)  HR: 70 (22 Jul 2023 11:16) (70 - 70)  BP: 113/53 (22 Jul 2023 11:16) (113/53 - 113/53)  BP(mean): --  RR: 18 (22 Jul 2023 11:16) (18 - 18)  SpO2: 94% (22 Jul 2023 11:16) (94% - 94%)    Parameters below as of 22 Jul 2023 11:16  Patient On (Oxygen Delivery Method): room air        acetaminophen     Tablet .. 650 milliGRAM(s) Oral every 6 hours PRN  apixaban 2.5 milliGRAM(s) Oral every 12 hours  atenolol  Tablet 25 milliGRAM(s) Oral daily  doxazosin 4 milliGRAM(s) Oral at bedtime  melatonin 3 milliGRAM(s) Oral at bedtime PRN  polyethylene glycol 3350 17 Gram(s) Oral daily PRN  senna 2 Tablet(s) Oral at bedtime      PHYSICAL EXAM:  GENERAL: NAD   EYES: conjunctiva and sclera clear  ENMT: Moist mucous membranes  NECK: Supple, No JVD, Normal thyroid  CHEST/LUNG: non labored, cta b/l  HEART: Regular rate and rhythm; No murmurs, rubs, or gallops  ABDOMEN: Soft, Nontender, Nondistended; Bowel sounds present  EXTREMITIES:  2+ Peripheral Pulses, No clubbing, cyanosis, or edema  LYMPH: No lymphadenopathy noted  SKIN: No rashes or lesions    Consultant(s) Notes Reviewed:  [x ] YES  [ ] NO  Care Discussed with Consultants/Other Providers [ x] YES  [ ] NO    LABS:                        10.1   x     )-----------( x        ( 22 Jul 2023 14:49 )             30.8     07-22    141  |  112<H>  |  40<H>  ----------------------------<  100<H>  3.4<L>   |  24  |  1.20    Ca    8.9      22 Jul 2023 06:32  Phos  2.9     07-22  Mg     2.2     07-22    TPro  6.1  /  Alb  3.1<L>  /  TBili  1.1  /  DBili  x   /  AST  14<L>  /  ALT  15  /  AlkPhos  46  07-21    PT/INR - ( 21 Jul 2023 21:27 )   PT: 16.7 sec;   INR: 1.42 ratio         PTT - ( 21 Jul 2023 21:27 )  PTT:29.9 sec  Urinalysis Basic - ( 22 Jul 2023 06:32 )    Color: x / Appearance: x / SG: x / pH: x  Gluc: 100 mg/dL / Ketone: x  / Bili: x / Urobili: x   Blood: x / Protein: x / Nitrite: x   Leuk Esterase: x / RBC: x / WBC x   Sq Epi: x / Non Sq Epi: x / Bacteria: x      CAPILLARY BLOOD GLUCOSE            Urinalysis Basic - ( 22 Jul 2023 06:32 )    Color: x / Appearance: x / SG: x / pH: x  Gluc: 100 mg/dL / Ketone: x  / Bili: x / Urobili: x   Blood: x / Protein: x / Nitrite: x   Leuk Esterase: x / RBC: x / WBC x   Sq Epi: x / Non Sq Epi: x / Bacteria: x        Culture - Blood (collected 20 Jul 2023 21:50)  Source: .Blood Blood-Peripheral  Preliminary Report (22 Jul 2023 01:03):    No growth at 24 hours    Culture - Blood (collected 20 Jul 2023 21:40)  Source: .Blood Blood-Peripheral  Preliminary Report (22 Jul 2023 01:03):    No growth at 24 hours        RADIOLOGY & ADDITIONAL TESTS:    Imaging Personally Reviewed  Reviewed consultants input
[facility]  Tommy Araujo MD PhD, Ellie Buchanan MD, Sydney Campo MD, Giancarlo Hurley MD, Gene Esquivel MD  and providing coverage with Sharlene Reilly MD  Providing Infectious Disease Consultations at [facility], [facility], [place], [place], [facility], [facility]'s    Office# [phone] to schedule follow up appointments  Answering Service for urgent calls or New Consults [phone]  Cell# to text for urgent issues Tommy Araujo [phone]     infectious diseases progress note:    TEODORO LAMAR is a 89y y. o. Male patient    Overnight and events of the last 24hrs reviewed    Allergies    Percocet (Other)  Levaquin (Other)  Benadryl (Other)    Intolerances        ANTIBIOTICS/RELEVANT:  antimicrobials    immunologic:    OTHER:  acetaminophen     Tablet .. 650 milliGRAM(s) Oral every 6 hours PRN  apixaban 2.5 milliGRAM(s) Oral every 12 hours  atenolol  Tablet 25 milliGRAM(s) Oral daily  bisacodyl 10 milliGRAM(s) Oral at bedtime  doxazosin 4 milliGRAM(s) Oral at bedtime  melatonin 3 milliGRAM(s) Oral at bedtime PRN  polyethylene glycol 3350 17 Gram(s) Oral daily      Objective:  Vital Signs Last 24 Hrs  T(C): 36.7 (24 Jul 2023 11:35), Max: 36.8 (23 Jul 2023 20:18)  T(F): 98 (24 Jul 2023 11:35), Max: 98.3 (23 Jul 2023 20:18)  HR: 68 (24 Jul 2023 11:35) (68 - 70)  BP: 125/61 (24 Jul 2023 11:35) (125/61 - 147/62)  BP(mean): --  RR: 18 (24 Jul 2023 11:35) (18 - 18)  SpO2: 93% (24 Jul 2023 11:35) (93% - 97%)    Parameters below as of 24 Jul 2023 11:35  Patient On (Oxygen Delivery Method): room air        T(C): 36.7 (07-24-23 @ 11:35), Max: 36.9 (07-22-23 @ 19:59)  T(C): 36.7 (07-24-23 @ 11:35), Max: 36.9 (07-22-23 @ 19:59)  T(C): 36.7 (07-24-23 @ 11:35), Max: 37 (07-21-23 @ 11:40)    PHYSICAL EXAM:  HEENT: NC atraumatic  Neck: supple  Respiratory: no accessory muscle use, breathing comfortably  Cardiovascular: distant  Gastrointestinal: normal appearing, nondistended  Extremities: no clubbing, no cyanosis,        LABS:                          10.6   6.12  )-----------( 150      ( 23 Jul 2023 09:11 )             31.3       WBC  6.12 07-23 @ 09:11  5.00 07-22 @ 06:32  6.51 07-21 @ 21:27  6.23 07-21 @ 06:27  9.87 07-20 @ 19:05      07-23    141  |  111<H>  |  28<H>  ----------------------------<  147<H>  4.0   |  25  |  1.10    Ca    8.8      23 Jul 2023 09:11        Creatinine: 1.10 mg/dL (07-23-23 @ 09:11)  Creatinine: 1.20 mg/dL (07-22-23 @ 06:32)  Creatinine: 1.40 mg/dL (07-21-23 @ 21:27)  Creatinine: 1.80 mg/dL (07-21-23 @ 06:27)  Creatinine: 2.70 mg/dL (07-20-23 @ 19:05)        Urinalysis Basic - ( 23 Jul 2023 09:11 )    Color: x / Appearance: x / SG: x / pH: x  Gluc: 147 mg/dL / Ketone: x  / Bili: x / Urobili: x   Blood: x / Protein: x / Nitrite: x   Leuk Esterase: x / RBC: x / WBC x   Sq Epi: x / Non Sq Epi: x / Bacteria: x            INFLAMMATORY MARKERS      MICROBIOLOGY:        RADIOLOGY & ADDITIONAL STUDIES:  
Patient is a 89y old  Male who presents with a chief complaint of Urinary obstruction/JONATHAN (21 Jul 2023 01:17)       HPI:  89y male with PMHx of HFrEF s/p PPM/AICD, CAD s/p CABG x6, Atrial fibrillation (on eliquis), s/p AVR, HTN, HLD, BPH with LUTS, squamous cell carcinoma presented to the ED for the evaluation of abdominal pain and weakness associated with decreased urinary output and constipation for the past 4 days. Patient developed nausea for the past 2 days and two episodes of NBNB vomiting. Patient and daughter denies fever, chills, CP, SOB, dysuria, hematuria, diarrhea. Patient states after bautista was placed in ED there was ~2L of drainage and symptoms of abdominal pain resolved.   Has not seen nephrologist in the past.  Denies any NSAID usage.  Feeling better.    PAST MEDICAL & SURGICAL HISTORY:  HFrEF (heart failure with reduced ejection fraction)      BPH (benign prostatic hyperplasia)      HTN (hypertension)      HLD (hyperlipidemia)      Atrial fibrillation      CAD (coronary artery disease)      Squamous cell carcinoma of skin      S/P AVR      S/P implantation of automatic cardioverter/defibrillator (AICD)           FAMILY HISTORY:  FHx: heart disease (Father, Mother)    NC    Social History:Non smoker    MEDICATIONS  (STANDING):  apixaban 2.5 milliGRAM(s) Oral every 12 hours  atenolol  Tablet 25 milliGRAM(s) Oral daily  doxazosin 4 milliGRAM(s) Oral at bedtime  senna 2 Tablet(s) Oral at bedtime    MEDICATIONS  (PRN):  acetaminophen     Tablet .. 650 milliGRAM(s) Oral every 6 hours PRN Mild Pain (1 - 3)  melatonin 3 milliGRAM(s) Oral at bedtime PRN Insomnia  polyethylene glycol 3350 17 Gram(s) Oral daily PRN Constipation   Meds reviewed    Allergies    Percocet (Other)  Levaquin (Other)  Benadryl (Other)    Intolerances         REVIEW OF SYSTEMS:    Review of Systems:   Constitutional: Denies fatigue  HEENT: Denies headaches and dizziness  Respiratory: denies SOB, cough, or wheezing  Cardiovascular: denies CP, palpitations  Gastrointestinal: Denies nausea, denies vomiting, diarrhea, constipation, abdominal pain, or bloody stools  Genitourinary: denies painful urination, increased frequency, urgency, or bloody urine  Skin: denies rashes or itching  Musculoskeletal: denies muscle aches, joint swelling  Neurologic: Denies generalized weakness, denies loss of sensation, numbness, or tingling                 ICU Vital Signs Last 24 Hrs  T(C): 36.7 (24 Jul 2023 11:35), Max: 36.8 (23 Jul 2023 20:18)  T(F): 98 (24 Jul 2023 11:35), Max: 98.3 (23 Jul 2023 20:18)  HR: 68 (24 Jul 2023 11:35) (68 - 70)  BP: 125/61 (24 Jul 2023 11:35) (125/61 - 147/62)  BP(mean): --  ABP: --  ABP(mean): --  RR: 18 (24 Jul 2023 11:35) (18 - 18)  SpO2: 93% (24 Jul 2023 11:35) (93% - 97%)    O2 Parameters below as of 24 Jul 2023 11:35  Patient On (Oxygen Delivery Method): room air      PHYSICAL EXAM:    GENERAL: NAD  HEAD:  Atraumatic, Normocephalic  EYES: EOMI, conjunctiva and sclera clear  ENMT: No Drainage from nares, No drainage from ears  NECK: Supple, neck  veins full  NERVOUS SYSTEM:  Awake and Alert  CHEST/LUNG: Clear to percussion bilaterally; No rales, rhonchi, wheezing, or rubs  HEART: Regular rate and rhythm; No murmurs, rubs, or gallops  ABDOMEN: Soft, Nontender, Nondistended; Bowel sounds present  EXTREMITIES:  +Edema  SKIN: No rashes No obvious ecchymosis      LABS:                                   10.6   6.12  )-----------( 150      ( 23 Jul 2023 09:11 )             31.3     07-23    141  |  111<H>  |  28<H>  ----------------------------<  147<H>  4.0   |  25  |  1.10    Ca    8.8      23 Jul 2023 09:11        Urinalysis Basic - ( 23 Jul 2023 09:11 )    Color: x / Appearance: x / SG: x / pH: x  Gluc: 147 mg/dL / Ketone: x  / Bili: x / Urobili: x   Blood: x / Protein: x / Nitrite: x   Leuk Esterase: x / RBC: x / WBC x   Sq Epi: x / Non Sq Epi: x / Bacteria: x              
Patient is a 89y old  Male who presents with a chief complaint of Urinary obstruction/JONATHAN (21 Jul 2023 01:17)       HPI:  89y male with PMHx of HFrEF s/p PPM/AICD, CAD s/p CABG x6, Atrial fibrillation (on eliquis), s/p AVR, HTN, HLD, BPH with LUTS, squamous cell carcinoma presented to the ED for the evaluation of abdominal pain and weakness associated with decreased urinary output and constipation for the past 4 days. Patient developed nausea for the past 2 days and two episodes of NBNB vomiting. Patient and daughter denies fever, chills, CP, SOB, dysuria, hematuria, diarrhea. Patient states after bautista was placed in ED there was ~2L of drainage and symptoms of abdominal pain resolved.   Has not seen nephrologist in the past.  Denies any NSAID usage.  Feeling better.    PAST MEDICAL & SURGICAL HISTORY:  HFrEF (heart failure with reduced ejection fraction)      BPH (benign prostatic hyperplasia)      HTN (hypertension)      HLD (hyperlipidemia)      Atrial fibrillation      CAD (coronary artery disease)      Squamous cell carcinoma of skin      S/P AVR      S/P implantation of automatic cardioverter/defibrillator (AICD)           FAMILY HISTORY:  FHx: heart disease (Father, Mother)    NC    Social History:Non smoker    MEDICATIONS  (STANDING):  apixaban 2.5 milliGRAM(s) Oral every 12 hours  atenolol  Tablet 25 milliGRAM(s) Oral daily  doxazosin 4 milliGRAM(s) Oral at bedtime  senna 2 Tablet(s) Oral at bedtime    MEDICATIONS  (PRN):  acetaminophen     Tablet .. 650 milliGRAM(s) Oral every 6 hours PRN Mild Pain (1 - 3)  melatonin 3 milliGRAM(s) Oral at bedtime PRN Insomnia  polyethylene glycol 3350 17 Gram(s) Oral daily PRN Constipation   Meds reviewed    Allergies    Percocet (Other)  Levaquin (Other)  Benadryl (Other)    Intolerances         REVIEW OF SYSTEMS:    Review of Systems:   Constitutional: Denies fatigue  HEENT: Denies headaches and dizziness  Respiratory: denies SOB, cough, or wheezing  Cardiovascular: denies CP, palpitations  Gastrointestinal: Denies nausea, denies vomiting, diarrhea, constipation, abdominal pain, or bloody stools  Genitourinary: denies painful urination, increased frequency, urgency, or bloody urine  Skin: denies rashes or itching  Musculoskeletal: denies muscle aches, joint swelling  Neurologic: Denies generalized weakness, denies loss of sensation, numbness, or tingling                 Vital Signs Last 24 Hrs  T(C): 36.3 (23 Jul 2023 05:44), Max: 36.9 (22 Jul 2023 19:59)  T(F): 97.4 (23 Jul 2023 05:44), Max: 98.4 (22 Jul 2023 19:59)  HR: 70 (23 Jul 2023 05:44) (70 - 70)  BP: 143/61 (23 Jul 2023 05:44) (113/53 - 143/64)  BP(mean): --  RR: 18 (23 Jul 2023 05:44) (18 - 18)  SpO2: 95% (23 Jul 2023 05:44) (94% - 98%)    Parameters below as of 23 Jul 2023 05:44  Patient On (Oxygen Delivery Method): room air          PHYSICAL EXAM:    GENERAL: NAD  HEAD:  Atraumatic, Normocephalic  EYES: EOMI, conjunctiva and sclera clear  ENMT: No Drainage from nares, No drainage from ears  NECK: Supple, neck  veins full  NERVOUS SYSTEM:  Awake and Alert  CHEST/LUNG: Clear to percussion bilaterally; No rales, rhonchi, wheezing, or rubs  HEART: Regular rate and rhythm; No murmurs, rubs, or gallops  ABDOMEN: Soft, Nontender, Nondistended; Bowel sounds present  EXTREMITIES:  +Edema  SKIN: No rashes No obvious ecchymosis      LABS:                        10.0   5.00  )-----------( 143      ( 22 Jul 2023 06:32 )             30.0     07-22    141  |  112<H>  |  40<H>  ----------------------------<  100<H>  3.4<L>   |  24  |  1.20    Ca    8.9      22 Jul 2023 06:32  Phos  2.9     07-22  Mg     2.2     07-22    TPro  6.1  /  Alb  3.1<L>  /  TBili  1.1  /  DBili  x   /  AST  14<L>  /  ALT  15  /  AlkPhos  46  07-21    PT/INR - ( 21 Jul 2023 21:27 )   PT: 16.7 sec;   INR: 1.42 ratio         PTT - ( 21 Jul 2023 21:27 )  PTT:29.9 sec  Urinalysis Basic - ( 22 Jul 2023 06:32 )    Color: x / Appearance: x / SG: x / pH: x  Gluc: 100 mg/dL / Ketone: x  / Bili: x / Urobili: x   Blood: x / Protein: x / Nitrite: x   Leuk Esterase: x / RBC: x / WBC x   Sq Epi: x / Non Sq Epi: x / Bacteria: x        
Patient is a 89y old  Male who presents with a chief complaint of Urinary obstruction/JONATHAN (21 Jul 2023 01:17)       HPI:  89y male with PMHx of HFrEF s/p PPM/AICD, CAD s/p CABG x6, Atrial fibrillation (on eliquis), s/p AVR, HTN, HLD, BPH with LUTS, squamous cell carcinoma presented to the ED for the evaluation of abdominal pain and weakness associated with decreased urinary output and constipation for the past 4 days. Patient developed nausea for the past 2 days and two episodes of NBNB vomiting. Patient and daughter denies fever, chills, CP, SOB, dysuria, hematuria, diarrhea. Patient states after bautista was placed in ED there was ~2L of drainage and symptoms of abdominal pain resolved.   Has not seen nephrologist in the past.  Denies any NSAID usage.  Feeling better.    PAST MEDICAL & SURGICAL HISTORY:  HFrEF (heart failure with reduced ejection fraction)      BPH (benign prostatic hyperplasia)      HTN (hypertension)      HLD (hyperlipidemia)      Atrial fibrillation      CAD (coronary artery disease)      Squamous cell carcinoma of skin      S/P AVR      S/P implantation of automatic cardioverter/defibrillator (AICD)           FAMILY HISTORY:  FHx: heart disease (Father, Mother)    NC    Social History:Non smoker    MEDICATIONS  (STANDING):  apixaban 2.5 milliGRAM(s) Oral every 12 hours  atenolol  Tablet 25 milliGRAM(s) Oral daily  doxazosin 4 milliGRAM(s) Oral at bedtime  senna 2 Tablet(s) Oral at bedtime    MEDICATIONS  (PRN):  acetaminophen     Tablet .. 650 milliGRAM(s) Oral every 6 hours PRN Mild Pain (1 - 3)  melatonin 3 milliGRAM(s) Oral at bedtime PRN Insomnia  polyethylene glycol 3350 17 Gram(s) Oral daily PRN Constipation   Meds reviewed    Allergies    Percocet (Other)  Levaquin (Other)  Benadryl (Other)    Intolerances         REVIEW OF SYSTEMS:    Review of Systems:   Constitutional: Denies fatigue  HEENT: Denies headaches and dizziness  Respiratory: denies SOB, cough, or wheezing  Cardiovascular: denies CP, palpitations  Gastrointestinal: Denies nausea, denies vomiting, diarrhea, constipation, abdominal pain, or bloody stools  Genitourinary: denies painful urination, increased frequency, urgency, or bloody urine  Skin: denies rashes or itching  Musculoskeletal: denies muscle aches, joint swelling  Neurologic: Denies generalized weakness, denies loss of sensation, numbness, or tingling      Vital Signs Last 24 Hrs  T(C): 37 (21 Jul 2023 11:40), Max: 37 (21 Jul 2023 11:40)  T(F): 98.6 (21 Jul 2023 11:40), Max: 98.6 (21 Jul 2023 11:40)  HR: 87 (21 Jul 2023 11:40) (87 - 87)  BP: 108/63 (21 Jul 2023 11:40) (108/63 - 108/63)  BP(mean): --  RR: 18 (21 Jul 2023 11:40) (18 - 18)  SpO2: 95% (21 Jul 2023 11:40) (95% - 95%)    Parameters below as of 21 Jul 2023 11:40  Patient On (Oxygen Delivery Method): room air      PHYSICAL EXAM:    GENERAL: NAD  HEAD:  Atraumatic, Normocephalic  EYES: EOMI, conjunctiva and sclera clear  ENMT: No Drainage from nares, No drainage from ears  NECK: Supple, neck  veins full  NERVOUS SYSTEM:  Awake and Alert  CHEST/LUNG: Clear to percussion bilaterally; No rales, rhonchi, wheezing, or rubs  HEART: Regular rate and rhythm; No murmurs, rubs, or gallops  ABDOMEN: Soft, Nontender, Nondistended; Bowel sounds present  EXTREMITIES:  +Edema  SKIN: No rashes No obvious ecchymosis      LABS:                        10.0   5.00  )-----------( 143      ( 22 Jul 2023 06:32 )             30.0     07-22    141  |  112<H>  |  40<H>  ----------------------------<  100<H>  3.4<L>   |  24  |  1.20    Ca    8.9      22 Jul 2023 06:32  Phos  2.9     07-22  Mg     2.2     07-22    TPro  6.1  /  Alb  3.1<L>  /  TBili  1.1  /  DBili  x   /  AST  14<L>  /  ALT  15  /  AlkPhos  46  07-21    PT/INR - ( 21 Jul 2023 21:27 )   PT: 16.7 sec;   INR: 1.42 ratio         PTT - ( 21 Jul 2023 21:27 )  PTT:29.9 sec  Urinalysis Basic - ( 22 Jul 2023 06:32 )    Color: x / Appearance: x / SG: x / pH: x  Gluc: 100 mg/dL / Ketone: x  / Bili: x / Urobili: x   Blood: x / Protein: x / Nitrite: x   Leuk Esterase: x / RBC: x / WBC x   Sq Epi: x / Non Sq Epi: x / Bacteria: x        
Patient is a 89y old  Male who presents with a chief complaint of Urinary obstruction/JONATHAN (21 Jul 2023 10:01)        INTERVAL HPI/OVERNIGHT EVENTS:   no complaints  pt seen and examined         Vital Signs Last 24 Hrs  T(C): 37 (21 Jul 2023 11:40), Max: 37 (21 Jul 2023 11:40)  T(F): 98.6 (21 Jul 2023 11:40), Max: 98.6 (21 Jul 2023 11:40)  HR: 87 (21 Jul 2023 11:40) (70 - 87)  BP: 108/63 (21 Jul 2023 11:40) (108/63 - 135/62)  BP(mean): --  RR: 18 (21 Jul 2023 11:40) (16 - 18)  SpO2: 95% (21 Jul 2023 11:40) (94% - 98%)    Parameters below as of 21 Jul 2023 11:40  Patient On (Oxygen Delivery Method): room air        acetaminophen     Tablet .. 650 milliGRAM(s) Oral every 6 hours PRN  apixaban 2.5 milliGRAM(s) Oral every 12 hours  atenolol  Tablet 25 milliGRAM(s) Oral daily  doxazosin 4 milliGRAM(s) Oral at bedtime  melatonin 3 milliGRAM(s) Oral at bedtime PRN  polyethylene glycol 3350 17 Gram(s) Oral daily PRN  senna 2 Tablet(s) Oral at bedtime      PHYSICAL EXAM:  GENERAL: NAD   EYES: conjunctiva and sclera clear  ENMT: Moist mucous membranes  NECK: Supple, No JVD, Normal thyroid  CHEST/LUNG: non labored, cta b/l  HEART: Regular rate and rhythm; No murmurs, rubs, or gallops  ABDOMEN: Soft, Nontender, Nondistended; Bowel sounds present  EXTREMITIES:  2+ Peripheral Pulses, No clubbing, cyanosis, or edema  LYMPH: No lymphadenopathy noted  SKIN: No rashes or lesions    Consultant(s) Notes Reviewed:  [x ] YES  [ ] NO  Care Discussed with Consultants/Other Providers [ x] YES  [ ] NO    LABS:                        11.2   6.23  )-----------( 135      ( 21 Jul 2023 06:27 )             33.6     07-21    140  |  109<H>  |  41<H>  ----------------------------<  93  3.6   |  23  |  1.80<H>    Ca    9.2      21 Jul 2023 06:27  Mg     2.3     07-20    TPro  6.1  /  Alb  3.1<L>  /  TBili  1.1  /  DBili  x   /  AST  14<L>  /  ALT  15  /  AlkPhos  46  07-21    PT/INR - ( 21 Jul 2023 06:27 )   PT: 17.1 sec;   INR: 1.46 ratio         PTT - ( 21 Jul 2023 06:27 )  PTT:24.4 sec  Urinalysis Basic - ( 21 Jul 2023 06:27 )    Color: x / Appearance: x / SG: x / pH: x  Gluc: 93 mg/dL / Ketone: x  / Bili: x / Urobili: x   Blood: x / Protein: x / Nitrite: x   Leuk Esterase: x / RBC: x / WBC x   Sq Epi: x / Non Sq Epi: x / Bacteria: x      CAPILLARY BLOOD GLUCOSE            Urinalysis Basic - ( 21 Jul 2023 06:27 )    Color: x / Appearance: x / SG: x / pH: x  Gluc: 93 mg/dL / Ketone: x  / Bili: x / Urobili: x   Blood: x / Protein: x / Nitrite: x   Leuk Esterase: x / RBC: x / WBC x   Sq Epi: x / Non Sq Epi: x / Bacteria: x          RADIOLOGY & ADDITIONAL TESTS:    Imaging Personally Reviewed  Reviewed consultants input

## 2023-07-24 NOTE — CONSULT NOTE ADULT - CONSULT REASON
JONATHAN on CKD
r/o UTI
89y A1C with Estimated Average Glucose Result: 6.9 % (07-21-23 @ 06:27)   new dx diabetes mellitus type 2

## 2023-07-24 NOTE — DISCHARGE NOTE PROVIDER - NSDCCPCAREPLAN_GEN_ALL_CORE_FT
PRINCIPAL DISCHARGE DIAGNOSIS  Diagnosis: Urinary tract obstruction  Assessment and Plan of Treatment:       SECONDARY DISCHARGE DIAGNOSES  Diagnosis: JONATHAN (acute kidney injury)  Assessment and Plan of Treatment:     Diagnosis: BPH with obstruction/lower urinary tract symptoms  Assessment and Plan of Treatment:     Diagnosis: Chronic HFrEF (heart failure with reduced ejection fraction)  Assessment and Plan of Treatment:

## 2023-07-24 NOTE — DISCHARGE NOTE PROVIDER - HOSPITAL COURSE
89y male with PMHx of HFrEF s/p PPM/AICD, CAD s/p CABG x6, Atrial fibrillation (on eliquis), s/p AVR, HTN, HLD, BPH with LUTS, squamous cell carcinoma presented to the ED for the evaluation of abdominal pain and weakness associated with decreased urinary output and constipation for the past 4 days. Patient developed nausea for the past 2 days and two episodes of NBNB vomiting. Patient and daughter denies fever, chills, CP, SOB, dysuria, hematuria, diarrhea. Patient states after bautista was placed in ED there was ~2L of drainage and symptoms of abdominal pain resolved. Pt was seen by nephrology and found to have JONATHAN which resolved with bautista placement.  Reportedly he had stopped taking his avodart on his own, this was resumed. He was also constipated, he was started on a bowel regimen without improvement. He had a fleet enema which relieved the constipation. a voiding trial was attempted but he was unable to void. The bautista was replaced and he is being dced to rehab to f/u with urology.  LABS:                        10.6   6.12  )-----------( 150      ( 23 Jul 2023 09:11 )             31.3     07-23    141  |  111<H>  |  28<H>  ----------------------------<  147<H>  4.0   |  25  |  1.10    Ca    8.8      23 Jul 2023 09:11    Urinalysis Basic - ( 23 Jul 2023 09:11 )    Color: x / Appearance: x / SG: x / pH: x  Gluc: 147 mg/dL / Ketone: x  / Bili: x / Urobili: x   Blood: x / Protein: x / Nitrite: x   Leuk Esterase: x / RBC: x / WBC x   Sq Epi: x / Non Sq Epi: x / Bacteria: x  Creatinine: 1.20 mg/dL (07.22.23 @ 06:32)   Creatinine: 1.40 mg/dL (07.21.23 @ 21:27)   Creatinine: 1.80 mg/dL (07.21.23 @ 06:27)   Creatinine: 2.70 mg/dL (07.20.23 @ 19:05)       RADIOLOGY & ADDITIONAL TESTS:  < from: CT Abdomen and Pelvis No Cont (07.20.23 @ 22:47) >    LOWER CHEST: Trace bilateral pleural effusions with associated   atelectasis at the lung bases. Cardiomegaly. Partially visualized median   sternotomy wires and AICD leads.    ABDOMEN:    Evaluation of the visceral organs and bowel is limited due to lack of IV   contrast.    LIVER: within normal limits.  BILE DUCTS: normal caliber.  GALLBLADDER: Cholelithiasis.  PANCREAS: Subcentimeter pancreatic cystic lesions measuring up to 0.9 cm   in the pancreatic neck.  SPLEEN: within normal limits.  ADRENALS: within normal limits.  KIDNEYS/URETERS: Left renal cysts. Cortical calcification in the right   kidney. Mild bilateral hydroureteronephrosis and perinephric stranding.    PELVIS:  REPRODUCTIVE ORGANS: Enlarged prostate gland, measuring 6.1 cm in   transverse dimension.  BLADDER: Bautista catheter.      BOWEL: No small bowel obstruction or active bowel inflammation. Duodenal   diverticulum. The appendix is normal.  PERITONEUM: no ascites or free air, no fluid collection.  VESSELS: within normal limits.  RETROPERITONEUM: within normal limits.  ABDOMINAL WALL: Surgical clips in the left inguinal region.  BONES: Degenerative changes.    IMPRESSION:    Enlarged prostate gland with mild bilateral hydroureteronephrosis and   perinephric stranding.    Subcentimeter pancreatic cystic lesions measuring up to 0.9 cm in the   pancreatic neck. MRI/MRCP can be obtained for further evaluation.    Trace bilateral pleural effusions with associated atelectasis at the lung   bases.    Cholelithiasis.    < end of copied text >    
no

## 2023-07-24 NOTE — PATIENT CHOICE NOTE. - NSPTCHOICESTATE_GEN_ALL_CORE

## 2023-07-24 NOTE — PROGRESS NOTE ADULT - REASON FOR ADMISSION
Urinary obstruction/JONATHAN

## 2023-07-26 LAB
CULTURE RESULTS: SIGNIFICANT CHANGE UP
CULTURE RESULTS: SIGNIFICANT CHANGE UP
SPECIMEN SOURCE: SIGNIFICANT CHANGE UP
SPECIMEN SOURCE: SIGNIFICANT CHANGE UP

## 2023-08-08 ENCOUNTER — TRANSCRIPTION ENCOUNTER (OUTPATIENT)
Age: 88
End: 2023-08-08

## 2023-10-21 ENCOUNTER — EMERGENCY (EMERGENCY)
Facility: HOSPITAL | Age: 88
LOS: 1 days | Discharge: HOME | End: 2023-10-21
Attending: STUDENT IN AN ORGANIZED HEALTH CARE EDUCATION/TRAINING PROGRAM | Admitting: STUDENT IN AN ORGANIZED HEALTH CARE EDUCATION/TRAINING PROGRAM
Payer: MEDICARE

## 2023-10-21 VITALS
TEMPERATURE: 98 F | RESPIRATION RATE: 18 BRPM | SYSTOLIC BLOOD PRESSURE: 144 MMHG | HEART RATE: 69 BPM | OXYGEN SATURATION: 99 % | DIASTOLIC BLOOD PRESSURE: 65 MMHG

## 2023-10-21 VITALS
HEART RATE: 70 BPM | HEIGHT: 68 IN | RESPIRATION RATE: 18 BRPM | WEIGHT: 160.06 LBS | SYSTOLIC BLOOD PRESSURE: 124 MMHG | TEMPERATURE: 98 F | DIASTOLIC BLOOD PRESSURE: 70 MMHG | OXYGEN SATURATION: 99 %

## 2023-10-21 DIAGNOSIS — Z95.810 PRESENCE OF AUTOMATIC (IMPLANTABLE) CARDIAC DEFIBRILLATOR: Chronic | ICD-10-CM

## 2023-10-21 DIAGNOSIS — Z95.2 PRESENCE OF PROSTHETIC HEART VALVE: Chronic | ICD-10-CM

## 2023-10-21 PROBLEM — I25.10 ATHEROSCLEROTIC HEART DISEASE OF NATIVE CORONARY ARTERY WITHOUT ANGINA PECTORIS: Chronic | Status: ACTIVE | Noted: 2023-07-21

## 2023-10-21 PROBLEM — E78.5 HYPERLIPIDEMIA, UNSPECIFIED: Chronic | Status: ACTIVE | Noted: 2023-07-20

## 2023-10-21 PROBLEM — C44.92 SQUAMOUS CELL CARCINOMA OF SKIN, UNSPECIFIED: Chronic | Status: ACTIVE | Noted: 2023-07-21

## 2023-10-21 PROBLEM — I10 ESSENTIAL (PRIMARY) HYPERTENSION: Chronic | Status: ACTIVE | Noted: 2023-07-20

## 2023-10-21 PROBLEM — I50.20 UNSPECIFIED SYSTOLIC (CONGESTIVE) HEART FAILURE: Chronic | Status: ACTIVE | Noted: 2023-07-20

## 2023-10-21 PROBLEM — I48.91 UNSPECIFIED ATRIAL FIBRILLATION: Chronic | Status: ACTIVE | Noted: 2023-07-20

## 2023-10-21 PROBLEM — N40.0 BENIGN PROSTATIC HYPERPLASIA WITHOUT LOWER URINARY TRACT SYMPTOMS: Chronic | Status: ACTIVE | Noted: 2023-07-20

## 2023-10-21 LAB
ALBUMIN SERPL ELPH-MCNC: 3.1 G/DL — LOW (ref 3.3–5)
ALBUMIN SERPL ELPH-MCNC: 3.1 G/DL — LOW (ref 3.3–5)
ALP SERPL-CCNC: 68 U/L — SIGNIFICANT CHANGE UP (ref 40–120)
ALP SERPL-CCNC: 68 U/L — SIGNIFICANT CHANGE UP (ref 40–120)
ALT FLD-CCNC: 17 U/L — SIGNIFICANT CHANGE UP (ref 12–78)
ALT FLD-CCNC: 17 U/L — SIGNIFICANT CHANGE UP (ref 12–78)
ANION GAP SERPL CALC-SCNC: 7 MMOL/L — SIGNIFICANT CHANGE UP (ref 5–17)
ANION GAP SERPL CALC-SCNC: 7 MMOL/L — SIGNIFICANT CHANGE UP (ref 5–17)
APPEARANCE UR: ABNORMAL
APTT BLD: 33.5 SEC — SIGNIFICANT CHANGE UP (ref 24.5–35.6)
APTT BLD: 33.5 SEC — SIGNIFICANT CHANGE UP (ref 24.5–35.6)
AST SERPL-CCNC: 15 U/L — SIGNIFICANT CHANGE UP (ref 15–37)
AST SERPL-CCNC: 15 U/L — SIGNIFICANT CHANGE UP (ref 15–37)
BASOPHILS # BLD AUTO: 0.02 K/UL — SIGNIFICANT CHANGE UP (ref 0–0.2)
BASOPHILS # BLD AUTO: 0.02 K/UL — SIGNIFICANT CHANGE UP (ref 0–0.2)
BASOPHILS NFR BLD AUTO: 0.4 % — SIGNIFICANT CHANGE UP (ref 0–2)
BASOPHILS NFR BLD AUTO: 0.4 % — SIGNIFICANT CHANGE UP (ref 0–2)
BILIRUB SERPL-MCNC: 0.7 MG/DL — SIGNIFICANT CHANGE UP (ref 0.2–1.2)
BILIRUB SERPL-MCNC: 0.7 MG/DL — SIGNIFICANT CHANGE UP (ref 0.2–1.2)
BILIRUB UR-MCNC: ABNORMAL
BILIRUB UR-MCNC: ABNORMAL
BILIRUB UR-MCNC: SIGNIFICANT CHANGE UP
BILIRUB UR-MCNC: SIGNIFICANT CHANGE UP
BUN SERPL-MCNC: 25 MG/DL — HIGH (ref 7–23)
BUN SERPL-MCNC: 25 MG/DL — HIGH (ref 7–23)
CALCIUM SERPL-MCNC: 9.2 MG/DL — SIGNIFICANT CHANGE UP (ref 8.5–10.1)
CALCIUM SERPL-MCNC: 9.2 MG/DL — SIGNIFICANT CHANGE UP (ref 8.5–10.1)
CHLORIDE SERPL-SCNC: 108 MMOL/L — SIGNIFICANT CHANGE UP (ref 96–108)
CHLORIDE SERPL-SCNC: 108 MMOL/L — SIGNIFICANT CHANGE UP (ref 96–108)
CO2 SERPL-SCNC: 26 MMOL/L — SIGNIFICANT CHANGE UP (ref 22–31)
CO2 SERPL-SCNC: 26 MMOL/L — SIGNIFICANT CHANGE UP (ref 22–31)
COLOR SPEC: ABNORMAL
CREAT SERPL-MCNC: 1.2 MG/DL — SIGNIFICANT CHANGE UP (ref 0.5–1.3)
CREAT SERPL-MCNC: 1.2 MG/DL — SIGNIFICANT CHANGE UP (ref 0.5–1.3)
DIFF PNL FLD: ABNORMAL
EGFR: 56 ML/MIN/1.73M2 — LOW
EGFR: 56 ML/MIN/1.73M2 — LOW
EOSINOPHIL # BLD AUTO: 0.06 K/UL — SIGNIFICANT CHANGE UP (ref 0–0.5)
EOSINOPHIL # BLD AUTO: 0.06 K/UL — SIGNIFICANT CHANGE UP (ref 0–0.5)
EOSINOPHIL NFR BLD AUTO: 1.1 % — SIGNIFICANT CHANGE UP (ref 0–6)
EOSINOPHIL NFR BLD AUTO: 1.1 % — SIGNIFICANT CHANGE UP (ref 0–6)
GLUCOSE SERPL-MCNC: 153 MG/DL — HIGH (ref 70–99)
GLUCOSE SERPL-MCNC: 153 MG/DL — HIGH (ref 70–99)
GLUCOSE UR QL: NEGATIVE MG/DL — SIGNIFICANT CHANGE UP
GLUCOSE UR QL: NEGATIVE MG/DL — SIGNIFICANT CHANGE UP
GLUCOSE UR QL: SIGNIFICANT CHANGE UP MG/DL
GLUCOSE UR QL: SIGNIFICANT CHANGE UP MG/DL
HCT VFR BLD CALC: 31.5 % — LOW (ref 39–50)
HCT VFR BLD CALC: 31.5 % — LOW (ref 39–50)
HGB BLD-MCNC: 10.5 G/DL — LOW (ref 13–17)
HGB BLD-MCNC: 10.5 G/DL — LOW (ref 13–17)
IMM GRANULOCYTES NFR BLD AUTO: 0.2 % — SIGNIFICANT CHANGE UP (ref 0–0.9)
IMM GRANULOCYTES NFR BLD AUTO: 0.2 % — SIGNIFICANT CHANGE UP (ref 0–0.9)
INR BLD: 1.13 RATIO — SIGNIFICANT CHANGE UP (ref 0.85–1.18)
INR BLD: 1.13 RATIO — SIGNIFICANT CHANGE UP (ref 0.85–1.18)
KETONES UR-MCNC: NEGATIVE MG/DL — SIGNIFICANT CHANGE UP
KETONES UR-MCNC: NEGATIVE MG/DL — SIGNIFICANT CHANGE UP
KETONES UR-MCNC: SIGNIFICANT CHANGE UP MG/DL
KETONES UR-MCNC: SIGNIFICANT CHANGE UP MG/DL
LEUKOCYTE ESTERASE UR-ACNC: ABNORMAL
LEUKOCYTE ESTERASE UR-ACNC: ABNORMAL
LEUKOCYTE ESTERASE UR-ACNC: SIGNIFICANT CHANGE UP
LEUKOCYTE ESTERASE UR-ACNC: SIGNIFICANT CHANGE UP
LIDOCAIN IGE QN: 72 U/L — SIGNIFICANT CHANGE UP (ref 13–75)
LIDOCAIN IGE QN: 72 U/L — SIGNIFICANT CHANGE UP (ref 13–75)
LYMPHOCYTES # BLD AUTO: 1.61 K/UL — SIGNIFICANT CHANGE UP (ref 1–3.3)
LYMPHOCYTES # BLD AUTO: 1.61 K/UL — SIGNIFICANT CHANGE UP (ref 1–3.3)
LYMPHOCYTES # BLD AUTO: 30.4 % — SIGNIFICANT CHANGE UP (ref 13–44)
LYMPHOCYTES # BLD AUTO: 30.4 % — SIGNIFICANT CHANGE UP (ref 13–44)
MCHC RBC-ENTMCNC: 28.9 PG — SIGNIFICANT CHANGE UP (ref 27–34)
MCHC RBC-ENTMCNC: 28.9 PG — SIGNIFICANT CHANGE UP (ref 27–34)
MCHC RBC-ENTMCNC: 33.3 GM/DL — SIGNIFICANT CHANGE UP (ref 32–36)
MCHC RBC-ENTMCNC: 33.3 GM/DL — SIGNIFICANT CHANGE UP (ref 32–36)
MCV RBC AUTO: 86.8 FL — SIGNIFICANT CHANGE UP (ref 80–100)
MCV RBC AUTO: 86.8 FL — SIGNIFICANT CHANGE UP (ref 80–100)
MONOCYTES # BLD AUTO: 0.4 K/UL — SIGNIFICANT CHANGE UP (ref 0–0.9)
MONOCYTES # BLD AUTO: 0.4 K/UL — SIGNIFICANT CHANGE UP (ref 0–0.9)
MONOCYTES NFR BLD AUTO: 7.5 % — SIGNIFICANT CHANGE UP (ref 2–14)
MONOCYTES NFR BLD AUTO: 7.5 % — SIGNIFICANT CHANGE UP (ref 2–14)
NEUTROPHILS # BLD AUTO: 3.2 K/UL — SIGNIFICANT CHANGE UP (ref 1.8–7.4)
NEUTROPHILS # BLD AUTO: 3.2 K/UL — SIGNIFICANT CHANGE UP (ref 1.8–7.4)
NEUTROPHILS NFR BLD AUTO: 60.4 % — SIGNIFICANT CHANGE UP (ref 43–77)
NEUTROPHILS NFR BLD AUTO: 60.4 % — SIGNIFICANT CHANGE UP (ref 43–77)
NITRITE UR-MCNC: POSITIVE
NITRITE UR-MCNC: POSITIVE
NITRITE UR-MCNC: SIGNIFICANT CHANGE UP
NITRITE UR-MCNC: SIGNIFICANT CHANGE UP
NRBC # BLD: 0 /100 WBCS — SIGNIFICANT CHANGE UP (ref 0–0)
NRBC # BLD: 0 /100 WBCS — SIGNIFICANT CHANGE UP (ref 0–0)
PH UR: 5 — SIGNIFICANT CHANGE UP (ref 5–8)
PH UR: 5 — SIGNIFICANT CHANGE UP (ref 5–8)
PH UR: SIGNIFICANT CHANGE UP (ref 5–8)
PH UR: SIGNIFICANT CHANGE UP (ref 5–8)
PLATELET # BLD AUTO: 161 K/UL — SIGNIFICANT CHANGE UP (ref 150–400)
PLATELET # BLD AUTO: 161 K/UL — SIGNIFICANT CHANGE UP (ref 150–400)
POTASSIUM SERPL-MCNC: 3.8 MMOL/L — SIGNIFICANT CHANGE UP (ref 3.5–5.3)
POTASSIUM SERPL-MCNC: 3.8 MMOL/L — SIGNIFICANT CHANGE UP (ref 3.5–5.3)
POTASSIUM SERPL-SCNC: 3.8 MMOL/L — SIGNIFICANT CHANGE UP (ref 3.5–5.3)
POTASSIUM SERPL-SCNC: 3.8 MMOL/L — SIGNIFICANT CHANGE UP (ref 3.5–5.3)
PROT SERPL-MCNC: 6.2 G/DL — SIGNIFICANT CHANGE UP (ref 6–8.3)
PROT SERPL-MCNC: 6.2 G/DL — SIGNIFICANT CHANGE UP (ref 6–8.3)
PROT UR-MCNC: 100 MG/DL
PROT UR-MCNC: 100 MG/DL
PROT UR-MCNC: SIGNIFICANT CHANGE UP MG/DL
PROT UR-MCNC: SIGNIFICANT CHANGE UP MG/DL
PROTHROM AB SERPL-ACNC: 13.2 SEC — HIGH (ref 9.5–13)
PROTHROM AB SERPL-ACNC: 13.2 SEC — HIGH (ref 9.5–13)
RBC # BLD: 3.63 M/UL — LOW (ref 4.2–5.8)
RBC # BLD: 3.63 M/UL — LOW (ref 4.2–5.8)
RBC # FLD: 16.1 % — HIGH (ref 10.3–14.5)
RBC # FLD: 16.1 % — HIGH (ref 10.3–14.5)
SODIUM SERPL-SCNC: 141 MMOL/L — SIGNIFICANT CHANGE UP (ref 135–145)
SODIUM SERPL-SCNC: 141 MMOL/L — SIGNIFICANT CHANGE UP (ref 135–145)
SP GR SPEC: 1.01 — SIGNIFICANT CHANGE UP (ref 1–1.03)
SP GR SPEC: 1.01 — SIGNIFICANT CHANGE UP (ref 1–1.03)
SP GR SPEC: SIGNIFICANT CHANGE UP (ref 1–1.03)
SP GR SPEC: SIGNIFICANT CHANGE UP (ref 1–1.03)
UROBILINOGEN FLD QL: 0.2 MG/DL — SIGNIFICANT CHANGE UP (ref 0.2–1)
UROBILINOGEN FLD QL: 0.2 MG/DL — SIGNIFICANT CHANGE UP (ref 0.2–1)
UROBILINOGEN FLD QL: SIGNIFICANT CHANGE UP MG/DL (ref 0.2–1)
UROBILINOGEN FLD QL: SIGNIFICANT CHANGE UP MG/DL (ref 0.2–1)
WBC # BLD: 5.3 K/UL — SIGNIFICANT CHANGE UP (ref 3.8–10.5)
WBC # BLD: 5.3 K/UL — SIGNIFICANT CHANGE UP (ref 3.8–10.5)
WBC # FLD AUTO: 5.3 K/UL — SIGNIFICANT CHANGE UP (ref 3.8–10.5)
WBC # FLD AUTO: 5.3 K/UL — SIGNIFICANT CHANGE UP (ref 3.8–10.5)

## 2023-10-21 PROCEDURE — 99285 EMERGENCY DEPT VISIT HI MDM: CPT | Mod: FS,25

## 2023-10-21 PROCEDURE — 74176 CT ABD & PELVIS W/O CONTRAST: CPT | Mod: MA

## 2023-10-21 PROCEDURE — 85610 PROTHROMBIN TIME: CPT

## 2023-10-21 PROCEDURE — 80053 COMPREHEN METABOLIC PANEL: CPT

## 2023-10-21 PROCEDURE — 99284 EMERGENCY DEPT VISIT MOD MDM: CPT | Mod: 25

## 2023-10-21 PROCEDURE — 85025 COMPLETE CBC W/AUTO DIFF WBC: CPT

## 2023-10-21 PROCEDURE — 81001 URINALYSIS AUTO W/SCOPE: CPT

## 2023-10-21 PROCEDURE — 74176 CT ABD & PELVIS W/O CONTRAST: CPT | Mod: 26,MA

## 2023-10-21 PROCEDURE — 51702 INSERT TEMP BLADDER CATH: CPT

## 2023-10-21 PROCEDURE — 36415 COLL VENOUS BLD VENIPUNCTURE: CPT

## 2023-10-21 PROCEDURE — 96374 THER/PROPH/DIAG INJ IV PUSH: CPT | Mod: XU

## 2023-10-21 PROCEDURE — 85730 THROMBOPLASTIN TIME PARTIAL: CPT

## 2023-10-21 PROCEDURE — 83690 ASSAY OF LIPASE: CPT

## 2023-10-21 PROCEDURE — 87086 URINE CULTURE/COLONY COUNT: CPT

## 2023-10-21 RX ORDER — LIDOCAINE HCL 20 MG/ML
10 VIAL (ML) INJECTION ONCE
Refills: 0 | Status: COMPLETED | OUTPATIENT
Start: 2023-10-21 | End: 2023-10-21

## 2023-10-21 RX ORDER — MINERAL OIL
133 OIL (ML) MISCELLANEOUS ONCE
Refills: 0 | Status: COMPLETED | OUTPATIENT
Start: 2023-10-21 | End: 2023-10-21

## 2023-10-21 RX ORDER — SODIUM CHLORIDE 9 MG/ML
500 INJECTION INTRAMUSCULAR; INTRAVENOUS; SUBCUTANEOUS ONCE
Refills: 0 | Status: COMPLETED | OUTPATIENT
Start: 2023-10-21 | End: 2023-10-21

## 2023-10-21 RX ORDER — CEFUROXIME AXETIL 250 MG
1 TABLET ORAL
Qty: 14 | Refills: 0
Start: 2023-10-21 | End: 2023-10-27

## 2023-10-21 RX ORDER — CEFTRIAXONE 500 MG/1
1000 INJECTION, POWDER, FOR SOLUTION INTRAMUSCULAR; INTRAVENOUS ONCE
Refills: 0 | Status: COMPLETED | OUTPATIENT
Start: 2023-10-21 | End: 2023-10-21

## 2023-10-21 RX ORDER — POLYETHYLENE GLYCOL 3350 17 G/17G
17 POWDER, FOR SOLUTION ORAL
Qty: 1 | Refills: 0
Start: 2023-10-21 | End: 2023-10-27

## 2023-10-21 RX ORDER — MAGNESIUM HYDROXIDE 400 MG/1
30 TABLET, CHEWABLE ORAL ONCE
Refills: 0 | Status: COMPLETED | OUTPATIENT
Start: 2023-10-21 | End: 2023-10-21

## 2023-10-21 RX ADMIN — SODIUM CHLORIDE 500 MILLILITER(S): 9 INJECTION INTRAMUSCULAR; INTRAVENOUS; SUBCUTANEOUS at 15:22

## 2023-10-21 RX ADMIN — Medication 10 MILLILITER(S): at 14:00

## 2023-10-21 RX ADMIN — CEFTRIAXONE 100 MILLIGRAM(S): 500 INJECTION, POWDER, FOR SOLUTION INTRAMUSCULAR; INTRAVENOUS at 18:23

## 2023-10-21 RX ADMIN — MAGNESIUM HYDROXIDE 30 MILLILITER(S): 400 TABLET, CHEWABLE ORAL at 15:22

## 2023-10-21 RX ADMIN — Medication 133 MILLILITER(S): at 18:34

## 2023-10-21 NOTE — ED ADULT NURSE NOTE - NSFALLHARMRISKINTERV_ED_ALL_ED

## 2023-10-21 NOTE — ED ADULT NURSE NOTE - CHIEF COMPLAINT QUOTE
c/o lower abd pain since this AM, hematuria with some clots when pt self cathed. pt on daily eliquis. pt recently started trazadone, first dose. last night. pmhx htn, chf, AICD, bph, bypass, hld, afib.

## 2023-10-21 NOTE — ED ADULT TRIAGE NOTE - CHIEF COMPLAINT QUOTE
c/o lower abd pain since this AM, hematuria with some clots when pt self cathed. pt on daily eliquis. pt recently started trazadone, first dose. last night. pmhx htn, chf, AICD, bph, bypass, hld. c/o lower abd pain since this AM, hematuria with some clots when pt self cathed. pt on daily eliquis. pt recently started trazadone, first dose. last night. pmhx htn, chf, AICD, bph, bypass, hld, afib.

## 2023-10-21 NOTE — ED PROVIDER NOTE - PATIENT PORTAL LINK FT
You can access the FollowMyHealth Patient Portal offered by [facility] by registering at the following website: [email or web address]. By joining [email or web address]’s FollowMyHealth portal, you will also be able to view your health information using other applications (apps) compatible with our system.

## 2023-10-21 NOTE — ED PROVIDER NOTE - NSFOLLOWUPINSTRUCTIONS_ED_ALL_ED_FT
Follow up with urology  take antibiotics as directed  return to er for any worsening symptoms     Hematuria, Adult    Hematuria is blood in the urine. Blood may be visible in the urine, or it may be identified with a test. This condition can be caused by infections of the bladder, urethra, kidney, or prostate. Other possible causes include:  Kidney stones.  Cancer of the urinary tract.  Too much calcium in the urine.  Conditions that are passed from parent to child (inherited conditions).  Exercise that requires a lot of energy.  Infections can usually be treated with medicine, and a kidney stone usually will pass through your urine. If neither of these is the cause of your hematuria, more tests may be needed to identify the cause of your symptoms.    It is very important to tell your health care provider about any blood in your urine, even if it is painless or the blood stops without treatment. Blood in the urine, when it happens and then stops and then happens again, can be a symptom of a very serious condition, including cancer. There is no pain in the initial stages of many urinary cancers.    Follow these instructions at home:  Medicines    Take over-the-counter and prescription medicines only as told by your health care provider.  If you were prescribed an antibiotic medicine, take it as told by your health care provider. Do not stop taking the antibiotic even if you start to feel better.  Eating and drinking    Drink enough fluid to keep your urine pale yellow. It is recommended that you drink 3–4 quarts (2.8–3.8 L) a day. If you have been diagnosed with an infection, drinking cranberry juice in addition to large amounts of water is recommended.  Avoid caffeine, tea, and carbonated beverages. These tend to irritate the bladder.  Avoid alcohol because it may irritate the prostate (in males).  General instructions    If you have been diagnosed with a kidney stone, follow your health care provider's instructions about straining your urine to catch the stone.  Empty your bladder often. Avoid holding urine for long periods of time.  If you are female:  After a bowel movement, wipe from front to back and use each piece of toilet paper only once.  Empty your bladder before and after sex.  Pay attention to any changes in your symptoms. Tell your health care provider about any changes or any new symptoms.  It is up to you to get the results of any tests. Ask your health care provider, or the department that is doing the test, when your results will be ready.  Keep all follow-up visits. This is important.  Contact a health care provider if:  You develop back pain.  You have a fever or chills.  You have nausea or vomiting.  Your symptoms do not improve after 3 days.  Your symptoms get worse.  Get help right away if:  You develop severe vomiting and are unable to take medicine without vomiting.  You develop severe pain in your back or abdomen even though you are taking medicine.  You pass a large amount of blood in your urine.  You pass blood clots in your urine.  You feel very weak or like you might faint.  You faint.  Summary  Hematuria is blood in the urine. It has many possible causes.  It is very important that you tell your health care provider about any blood in your urine, even if it is painless or the blood stops without treatment.  Take over-the-counter and prescription medicines only as told by your health care provider.  Drink enough fluid to keep your urine pale yellow.  This information is not intended to replace advice given to you by your health care provider. Make sure you discuss any questions you have with your health care provider.

## 2023-10-21 NOTE — ED PROVIDER NOTE - DIFFERENTIAL DIAGNOSIS
hematuria, infection, bladder mass, doubt kidney stone, trauma from self cath Differential Diagnosis

## 2023-10-21 NOTE — ED ADULT NURSE NOTE - NSICDXFAMILYHX_GEN_ALL_CORE_FT
FAMILY HISTORY:  Father  Still living? Unknown  FHx: heart disease, Age at diagnosis: Age Unknown    Mother  Still living? No  FHx: heart disease, Age at diagnosis: Age Unknown

## 2023-10-21 NOTE — ED ADULT NURSE NOTE - BREATHING, MLM
Past Medical History:   Diagnosis Date   • Abnormal Pap smear    • Arm fracture, left 1958    Casted, no surgery   • Callus    • Clotting disorder (CMS/HCC) 2010    Since Stents were placed in coronary arteries   • Corns and callosities    • Coronary Artery Disease 06/10/2010   • COVID-19 virus infection 12/25/2022   • Diabetes Mellitus     type 2 - Checks blood sugars daily   • Difficulty initiating walking     Reg shoes without assistive devices.   • GERD    • Hypercholesterolemia    • Hypertension    • LBP (low back pain) 06/14/2013   • Neuromuscular disorder (CMS/HCC)     diaphragmatic hernia   • Normal vaginal delivery     X 3   • Obesity    • Onychomycosis 05/20/2013   • Palpitations 10/2011   • Peptic ulcer    • Polio 1950s as a child    left side weakness, treated as a child with pills and subsequently was back sedated. Points out that her left foot is smaller than the right.   • Rectal polyp    • Urinary incontinence    • Wears glasses         Spontaneous, unlabored and symmetrical

## 2023-10-21 NOTE — ED PROVIDER NOTE - ATTENDING APP SHARED VISIT CONTRIBUTION OF CARE
This was a shared visit with MARTIN. I reviewed and verified the documentation and independently performed the documented MDM.

## 2023-10-21 NOTE — ED PROVIDER NOTE - OBJECTIVE STATEMENT
Patient is a 89-year-old male with past medical history of CHF status post pacemaker AICD CAD status post CABG A-fib on Eliquis hypertension lipidemia BPH with self catheterizes, cell carcinoma coming in for blood in the urine since this morning.  Patient states he self catheterize around 3 AM and had no difficulties and no blood catheterized at 9 AM noted to have bloody urine denies any clots denies any difficulty with catheterization no pain or trauma.  Denies any numbness tingling or weakness dysuria flank pain fever or chills. Pt does admit he has some constipation.

## 2023-10-21 NOTE — ED PROVIDER NOTE - PROGRESS NOTE DETAILS
+ constipation and uti + given ceftriaxone and milk of magnesium bautista draining no clots mildly lightening of bloody urine    advised to call urologist monday for f/u will dc with leg bag

## 2023-10-21 NOTE — ED PROVIDER NOTE - CLINICAL SUMMARY MEDICAL DECISION MAKING FREE TEXT BOX
here with hematuria in setting of self cath. check labs, UA. Uro Hsaio contacted- likely from trauma from self catheterization. recommends placing normal Phelps not 3 way which should tamponade bleeding. if urine clearing can follow up with his own urologist.

## 2023-10-21 NOTE — ED PROVIDER NOTE - CARE PROVIDER_API CALL
Norma Alvarenga  Urology  [facility], [address]  [place] [address]  Phone: [phone]  Fax: [phone]  Follow Up Time:

## 2023-10-22 LAB
CULTURE RESULTS: SIGNIFICANT CHANGE UP
SPECIMEN SOURCE: SIGNIFICANT CHANGE UP

## 2023-12-20 ENCOUNTER — EMERGENCY (EMERGENCY)
Facility: HOSPITAL | Age: 88
LOS: 1 days | Discharge: HOME | End: 2023-12-20
Attending: INTERNAL MEDICINE | Admitting: INTERNAL MEDICINE
Payer: MEDICARE

## 2023-12-20 VITALS
SYSTOLIC BLOOD PRESSURE: 154 MMHG | RESPIRATION RATE: 16 BRPM | OXYGEN SATURATION: 100 % | HEIGHT: 68 IN | TEMPERATURE: 97 F | HEART RATE: 70 BPM | DIASTOLIC BLOOD PRESSURE: 74 MMHG | WEIGHT: 156.97 LBS

## 2023-12-20 DIAGNOSIS — Z95.810 PRESENCE OF AUTOMATIC (IMPLANTABLE) CARDIAC DEFIBRILLATOR: Chronic | ICD-10-CM

## 2023-12-20 DIAGNOSIS — Z95.2 PRESENCE OF PROSTHETIC HEART VALVE: Chronic | ICD-10-CM

## 2023-12-20 PROCEDURE — 99283 EMERGENCY DEPT VISIT LOW MDM: CPT | Mod: 25

## 2023-12-20 PROCEDURE — 51702 INSERT TEMP BLADDER CATH: CPT

## 2023-12-20 NOTE — ED ADULT NURSE REASSESSMENT NOTE - NS ED NURSE REASSESS COMMENT FT1
Bautista replaced with 16 indwelling bautista cath, tolerated well, draining freely. Patient is AOx4, safety precautions in place, awaiting evaluation Bautista replaced with 16 indwelling bautista cath, tolerated well, draining freely. Patient is AOx4, safety precautions in place, awaiting evaluation    @0463 swapped with leg bag, waiting DC Bautista replaced with 16 indwelling bautista cath, tolerated well, draining freely. Patient is AOx4, safety precautions in place, awaiting evaluation    @2798 swapped with leg bag, waiting DC

## 2023-12-20 NOTE — ED PROVIDER NOTE - SIGNIFICANT NEGATIVE FINDINGS
no headache, no chest pain, no SOB, no palpitations, no n/v, no urinary  bleeding. no neuro changes.

## 2023-12-20 NOTE — ED PROVIDER NOTE - CARE PROVIDER_API CALL
Miko Page  Internal Medicine  [address] [place] [address]  Phone: [phone]  Fax: [phone]  Follow Up Time: 1-3 Days   Miko Page  Internal Medicine  [address] [place] [address]  Phone: [phone]  Fax: [phone]  Follow Up Time: 1-3 Days

## 2023-12-20 NOTE — ED ADULT NURSE NOTE - NSFALLUNIVINTERV_ED_ALL_ED
Bed/Stretcher in lowest position, wheels locked, appropriate side rails in place/Call bell, personal items and telephone in reach/Instruct patient to call for assistance before getting out of bed/chair/stretcher/Non-slip footwear applied when patient is off stretcher/[place] to call system/Physically safe environment - no spills, clutter or unnecessary equipment/Purposeful proactive rounding/Room/bathroom lighting operational, light cord in reach Bed/Stretcher in lowest position, wheels locked, appropriate side rails in place/Call bell, personal items and telephone in reach/Instruct patient to call for assistance before getting out of bed/chair/stretcher/Non-slip footwear applied when patient is off stretcher/[place] to call system/Physically safe environment - no spills, clutter or unnecessary equipment/Purposeful proactive rounding/Room/bathroom lighting operational, light cord in reach

## 2023-12-20 NOTE — ED ADULT NURSE NOTE - OBJECTIVE STATEMENT
Patient received complaining of bautista cath clogging / not draining with groin pain. Denies fever,

## 2023-12-20 NOTE — ED PROVIDER NOTE - PATIENT PORTAL LINK FT
You can access the FollowMyHealth Patient Portal offered by [facility] by registering at the following website: [email or web address]. By joining [employer]’s FollowMyHealth portal, you will also be able to view your health information using other applications (apps) compatible with our system. You can access the FollowMyHealth Patient Portal offered by [facility] by registering at the following website: [email or web address]. By joining [employer]’s FollowMyHealth portal, you will also be able to view your health information using other applications (apps) compatible with our system.

## 2023-12-20 NOTE — ED PROVIDER NOTE - OBJECTIVE STATEMENT
Pt daughter states urinary catheter is not draining, bladder distended  urinary catheter complications Pt daughter states urinary catheter is not draining, bladder distended  urinary catheter complications  he has a 16 coude in for about three weeks, daughter is requesting a change in cathter 89 y/o male, Pt daughter states urinary catheter is not draining, bladder distended, he has a 16 coude in for about three weeks, daughter is requesting a change in cathter. no headache, no chest pain, no SOB, no palpitations, no fever, no chills, no toxemia, no n/v no urinary bleeding. no neuro changes. 89 y/o male, Pt daughter states urinary catheter is not draining, bladder distended, he has a 16 coude in for about three weeks, daughter is requesting a change in cathter. no headache, no chest pain, no SOB, no palpitations, no fever, no chills, no toxemia, no n/v no urinary bleeding. no neuro changes.

## 2023-12-22 NOTE — ED PROCEDURE NOTE - NS ED ATTENDING STATEMENT MOD
Ventricular Rate : 91  Atrial Rate : 91  P-R Interval : 124  QRS Duration : 117  Q-T Interval : 369  QTC Calculation(Bezet) : 454  P Axis : 61  R Axis : 16  T Axis : 20  Diagnosis : Sinus rhythm  Incomplete right bundle branch block    Confirmed by Karel Agustin ([id number]) on 3/23/2019 1:29:15 AM  
Attending Only

## 2023-12-22 NOTE — ED PROCEDURE NOTE - CPROC ED TIME OUT STATEMENT1
“Patient's name, , procedure and correct site were confirmed during the [facility] Timeout.” “Patient's name, , procedure and correct site were confirmed during the [facility] Timeout.”

## 2025-03-25 NOTE — ED PROVIDER NOTE - SKIN, MLM
:  Assessment & Plan  Bilateral impacted cerumen    Orders:    Ambulatory Referral to Otolaryngology    Ear cerumen removal    Abrasion of right ear canal, initial encounter         On exam noted bilateral cerumen impaction and unable to fully view tympanic membrane.  Cerumen impaction removed bilateral eac with irrigation, alligator forceps and suction, pt tolerated procedure well.   Noted area of abrasion along floor of right ear canal. Well healing.   Partial removal of scab (dried bloody debris) right ear canal. No current bleeding. Tiny pieces remain. Continue ear drops prescribed by Dr Jones - twice day for 5 more days to the right ear to remove the residual debris.     Upon removal, improved hearing and decreased clogged sensation of bilateral ears.  Discussed routine cerumen care including avoidance of q-tips, may use cerumen softeners every one to two months.  Hydrocortisone cream pea sized amount on finger as needed for itching in ears.  Encourage ongoing follow up annually to monitor for cerumen and hearing.  Audiogram if symptoms worsen.    Follow up in one to two years    History of Present Illness     Jose Olson is a 60 y.o. male   Presents today as a new patient due to ear concerns.  PCP attempted cerumen removal without success. Hearing gradually worsening.  Bilateral ears feel blocked.  No tinnitus.  No otalgia. Bloody otorrhea right ear after cerumen removal by PCP.  No history of ear surgery.  No current hearing aids.          Review of Systems   Constitutional: Negative.    HENT:  Negative for congestion, ear discharge, ear pain, hearing loss, nosebleeds, postnasal drip, rhinorrhea, sinus pressure, sinus pain, sore throat, tinnitus and voice change.         Blocked ears     Respiratory:  Negative for chest tightness and shortness of breath.    Skin:  Negative for color change.   Neurological:  Negative for dizziness, numbness and headaches.   Psychiatric/Behavioral: Negative.    "    Objective   Pulse 80   Temp (!) 97.4 °F (36.3 °C) (Temporal)   Ht 5' 10\" (1.778 m)   Wt 100 kg (221 lb)   SpO2 97%   BMI 31.71 kg/m²      Physical Exam  Vitals and nursing note reviewed.   Constitutional:       General: He is not in acute distress.     Appearance: He is well-developed.   HENT:      Head: Normocephalic and atraumatic.      Right Ear: Tympanic membrane, ear canal and external ear normal. There is impacted cerumen.      Left Ear: Tympanic membrane, ear canal and external ear normal. There is impacted cerumen.      Nose: Nose normal.      Mouth/Throat:      Mouth: Mucous membranes are moist.   Pulmonary:      Effort: Pulmonary effort is normal.   Musculoskeletal:      Cervical back: Neck supple.   Skin:     General: Skin is warm and dry.   Neurological:      Mental Status: He is alert.   Psychiatric:         Mood and Affect: Mood normal.       Ear cerumen removal    Date/Time: 3/25/2025 9:30 AM    Performed by: SOFIA Pulido  Authorized by: SOFIA Pulido  Universal Protocol:  procedure performed by consultantConsent: Verbal consent obtained.  Risks and benefits: risks, benefits and alternatives were discussed  Consent given by: patient  Patient understanding: patient states understanding of the procedure being performed    Patient location:  Clinic  Procedure details:     Local anesthetic:  None    Location:  L ear and R ear    Approach:  External  Post-procedure details:     Complication:  None    Hearing quality:  Normal    Patient tolerance of procedure:  Tolerated well, no immediate complications          " Skin normal color for race, warm, dry and intact. No evidence of rash.

## 2025-06-19 NOTE — ED PROVIDER NOTE - OBJECTIVE STATEMENT
89 male past medical history of PPM/AICD, CHF, A-fib on Eliquis, aortic valve replacement bovine, presents to the emergency department with daughter, patient lives alone, patient has been having abdominal pain, decreased appetite, decreased urine output since Monday, had 3 episodes of vomiting on Tuesday, and no bowel movement, patient complaining of increased abdominal distention, weakness. O positive